# Patient Record
Sex: FEMALE | Race: AMERICAN INDIAN OR ALASKA NATIVE | NOT HISPANIC OR LATINO | Employment: UNEMPLOYED
[De-identification: names, ages, dates, MRNs, and addresses within clinical notes are randomized per-mention and may not be internally consistent; named-entity substitution may affect disease eponyms.]

---

## 2017-06-24 ENCOUNTER — HEALTH MAINTENANCE LETTER (OUTPATIENT)
Age: 22
End: 2017-06-24

## 2019-10-02 ENCOUNTER — HEALTH MAINTENANCE LETTER (OUTPATIENT)
Age: 24
End: 2019-10-02

## 2021-01-15 ENCOUNTER — HEALTH MAINTENANCE LETTER (OUTPATIENT)
Age: 26
End: 2021-01-15

## 2021-09-05 ENCOUNTER — HEALTH MAINTENANCE LETTER (OUTPATIENT)
Age: 26
End: 2021-09-05

## 2022-02-19 ENCOUNTER — HEALTH MAINTENANCE LETTER (OUTPATIENT)
Age: 27
End: 2022-02-19

## 2022-10-22 ENCOUNTER — HEALTH MAINTENANCE LETTER (OUTPATIENT)
Age: 27
End: 2022-10-22

## 2023-04-01 ENCOUNTER — HEALTH MAINTENANCE LETTER (OUTPATIENT)
Age: 28
End: 2023-04-01

## 2023-06-14 ENCOUNTER — HOSPITAL ENCOUNTER (EMERGENCY)
Facility: CLINIC | Age: 28
Discharge: HOME | End: 2023-06-15
Attending: EMERGENCY MEDICINE | Admitting: EMERGENCY MEDICINE
Payer: COMMERCIAL

## 2023-06-14 VITALS
OXYGEN SATURATION: 97 % | SYSTOLIC BLOOD PRESSURE: 116 MMHG | DIASTOLIC BLOOD PRESSURE: 77 MMHG | HEIGHT: 62 IN | BODY MASS INDEX: 41.59 KG/M2 | RESPIRATION RATE: 18 BRPM | HEART RATE: 68 BPM | WEIGHT: 226 LBS | TEMPERATURE: 98 F

## 2023-06-14 DIAGNOSIS — R03.0 ELEVATED BLOOD PRESSURE READING WITHOUT DIAGNOSIS OF HYPERTENSION: ICD-10-CM

## 2023-06-14 LAB
ALBUMIN SERPL BCG-MCNC: 3.9 G/DL (ref 3.5–5.2)
ALBUMIN UR-MCNC: NEGATIVE MG/DL
ALP SERPL-CCNC: 75 U/L (ref 35–104)
ALT SERPL W P-5'-P-CCNC: 22 U/L (ref 0–50)
ANION GAP SERPL CALCULATED.3IONS-SCNC: 12 MMOL/L (ref 7–15)
APPEARANCE UR: CLEAR
AST SERPL W P-5'-P-CCNC: 19 U/L (ref 0–45)
BACTERIA #/AREA URNS HPF: ABNORMAL /HPF
BASOPHILS # BLD AUTO: 0.1 10E3/UL (ref 0–0.2)
BASOPHILS NFR BLD AUTO: 1 %
BILIRUB SERPL-MCNC: 0.3 MG/DL
BILIRUB UR QL STRIP: NEGATIVE
BUN SERPL-MCNC: 15.2 MG/DL (ref 6–20)
CALCIUM SERPL-MCNC: 9.3 MG/DL (ref 8.6–10)
CHLORIDE SERPL-SCNC: 104 MMOL/L (ref 98–107)
COLOR UR AUTO: ABNORMAL
CREAT SERPL-MCNC: 0.8 MG/DL (ref 0.51–0.95)
DEPRECATED HCO3 PLAS-SCNC: 23 MMOL/L (ref 22–29)
EOSINOPHIL # BLD AUTO: 0.5 10E3/UL (ref 0–0.7)
EOSINOPHIL NFR BLD AUTO: 5 %
ERYTHROCYTE [DISTWIDTH] IN BLOOD BY AUTOMATED COUNT: 13.5 % (ref 10–15)
GFR SERPL CREATININE-BSD FRML MDRD: >90 ML/MIN/1.73M2
GLUCOSE SERPL-MCNC: 94 MG/DL (ref 70–99)
GLUCOSE UR STRIP-MCNC: NEGATIVE MG/DL
HCT VFR BLD AUTO: 40.1 % (ref 35–47)
HGB BLD-MCNC: 12.6 G/DL (ref 11.7–15.7)
HGB UR QL STRIP: ABNORMAL
IMM GRANULOCYTES # BLD: 0 10E3/UL
IMM GRANULOCYTES NFR BLD: 0 %
KETONES UR STRIP-MCNC: NEGATIVE MG/DL
LEUKOCYTE ESTERASE UR QL STRIP: ABNORMAL
LIPASE SERPL-CCNC: 31 U/L (ref 13–60)
LYMPHOCYTES # BLD AUTO: 3.2 10E3/UL (ref 0.8–5.3)
LYMPHOCYTES NFR BLD AUTO: 31 %
MCH RBC QN AUTO: 28.1 PG (ref 26.5–33)
MCHC RBC AUTO-ENTMCNC: 31.4 G/DL (ref 31.5–36.5)
MCV RBC AUTO: 90 FL (ref 78–100)
MONOCYTES # BLD AUTO: 0.9 10E3/UL (ref 0–1.3)
MONOCYTES NFR BLD AUTO: 8 %
MUCOUS THREADS #/AREA URNS LPF: PRESENT /LPF
NEUTROPHILS # BLD AUTO: 5.7 10E3/UL (ref 1.6–8.3)
NEUTROPHILS NFR BLD AUTO: 55 %
NITRATE UR QL: NEGATIVE
NRBC # BLD AUTO: 0 10E3/UL
NRBC BLD AUTO-RTO: 0 /100
PH UR STRIP: 6 [PH] (ref 5–7)
PLATELET # BLD AUTO: 390 10E3/UL (ref 150–450)
POTASSIUM SERPL-SCNC: 4.4 MMOL/L (ref 3.4–5.3)
PROT SERPL-MCNC: 6.8 G/DL (ref 6.4–8.3)
RBC # BLD AUTO: 4.48 10E6/UL (ref 3.8–5.2)
RBC URINE: 1 /HPF
SODIUM SERPL-SCNC: 139 MMOL/L (ref 136–145)
SP GR UR STRIP: 1.01 (ref 1–1.03)
SQUAMOUS EPITHELIAL: 1 /HPF
UROBILINOGEN UR STRIP-MCNC: NORMAL MG/DL
WBC # BLD AUTO: 10.4 10E3/UL (ref 4–11)
WBC URINE: 4 /HPF

## 2023-06-14 PROCEDURE — 99283 EMERGENCY DEPT VISIT LOW MDM: CPT | Performed by: EMERGENCY MEDICINE

## 2023-06-14 PROCEDURE — 83690 ASSAY OF LIPASE: CPT | Performed by: EMERGENCY MEDICINE

## 2023-06-14 PROCEDURE — 85025 COMPLETE CBC W/AUTO DIFF WBC: CPT | Performed by: EMERGENCY MEDICINE

## 2023-06-14 PROCEDURE — 36415 COLL VENOUS BLD VENIPUNCTURE: CPT | Performed by: EMERGENCY MEDICINE

## 2023-06-14 PROCEDURE — 81003 URINALYSIS AUTO W/O SCOPE: CPT | Performed by: EMERGENCY MEDICINE

## 2023-06-14 PROCEDURE — 80053 COMPREHEN METABOLIC PANEL: CPT | Performed by: EMERGENCY MEDICINE

## 2023-06-14 ASSESSMENT — ACTIVITIES OF DAILY LIVING (ADL): ADLS_ACUITY_SCORE: 35

## 2023-06-15 NOTE — ED PROVIDER NOTES
"    History     chief complaint  HPI  Patient is a 28-year-old G1, P1 female who is postpartum from vaginal delivery roughly 3-1/2 weeks with a history of prehypertension, elevated BMI, and gestational diabetes who is presenting with elevated blood pressures.  She had a blood pressure elevated in the 150s today.  Pregnancy was not complicated by preeclampsia.  She was not on blood pressure meds prior to the pregnancy but was told that she had prehypertension and was on a baby aspirin throughout her pregnancy.  Through the last week she has had intermittent episodes where she feels \"fuzzy\".  No vertigo symptoms.  Slightly lightheaded feeling.  Associated with palpitations.  No dyspnea or chest pain.  No leg swelling.  No persistent abdominal pain.  No persistent headaches.  No vision changes.  She was told to come in by the nurse line for her hypertension.    Review of Systems:  All organ systems below were reviewed and are negative unless indicated in the HPI.    Constitutional  Eyes  ENT  Respiratory  Cardiovascular  Gastrointestinal  Genitourinary  Musculoskeletal  Skin  Neuro    Allergies:  No Known Allergies    Problem List:    Patient Active Problem List    Diagnosis Date Noted     Obesity, morbid (more than 100 lbs over ideal weight or BMI > 40) (H) 06/26/2013     Priority: Medium     Tobacco abuse 06/26/2013     Priority: Medium     Acne 03/13/2013     Priority: Medium        Past Medical History:    Past Medical History:   Diagnosis Date     Acne      Allergic rhinitis      Obesity        Past Surgical History:    Past Surgical History:   Procedure Laterality Date     NO HISTORY OF SURGERY         Family History:    Family History   Problem Relation Age of Onset     Diabetes Paternal Grandmother      Hypertension Maternal Grandmother        Medications:    cyclobenzaprine (FLEXERIL) 10 MG tablet  levonorgestrel-ethinyl estradiol (AVIANE,ALESSE,LESSINA) 0.1-20 MG-MCG per tablet  naproxen (NAPROSYN) 500 MG " "tablet          Physical Exam   BP: (!) 149/107  Pulse: 80  Temp: 98  F (36.7  C)  Resp: 18  Height: 157.5 cm (5' 2\")  Weight: 102.5 kg (226 lb)  SpO2: 99 %    Gen: Vital signs reviewed  Eyes: Sclera white, pupils round  ENT: External ears and nares normal  Card: Regular rate and rhythm  Resp: No respiratory distress. Lungs clear to auscultation bilaterally  Abd: Soft, non-distended, non-tender  Extremities: Symmetric distal pulses.  Skin: No rashes  Neuro: Alert, speech normal. Face is symmetric.  Strength and sensation intact throughout.  No dysmetria.  Visual fields grossly intact.  Hearing grossly intact.       ED Course        Procedures                Results for orders placed or performed during the hospital encounter of 06/14/23 (from the past 24 hour(s))   CBC with platelets differential    Narrative    The following orders were created for panel order CBC with platelets differential.  Procedure                               Abnormality         Status                     ---------                               -----------         ------                     CBC with platelets and d...[[id number]]  Abnormal            Final result                 Please view results for these tests on the individual orders.   Comprehensive metabolic panel   Result Value Ref Range    Sodium 139 136 - 145 mmol/L    Potassium 4.4 3.4 - 5.3 mmol/L    Chloride 104 98 - 107 mmol/L    Carbon Dioxide (CO2) 23 22 - 29 mmol/L    Anion Gap 12 7 - 15 mmol/L    Urea Nitrogen 15.2 6.0 - 20.0 mg/dL    Creatinine 0.80 0.51 - 0.95 mg/dL    Calcium 9.3 8.6 - 10.0 mg/dL    Glucose 94 70 - 99 mg/dL    Alkaline Phosphatase 75 35 - 104 U/L    AST 19 0 - 45 U/L    ALT 22 0 - 50 U/L    Protein Total 6.8 6.4 - 8.3 g/dL    Albumin 3.9 3.5 - 5.2 g/dL    Bilirubin Total 0.3 <=1.2 mg/dL    GFR Estimate >90 >60 mL/min/1.73m2   Lipase   Result Value Ref Range    Lipase 31 13 - 60 U/L   CBC with platelets and differential   Result Value Ref Range    WBC " Count 10.4 4.0 - 11.0 10e3/uL    RBC Count 4.48 3.80 - 5.20 10e6/uL    Hemoglobin 12.6 11.7 - 15.7 g/dL    Hematocrit 40.1 35.0 - 47.0 %    MCV 90 78 - 100 fL    MCH 28.1 26.5 - 33.0 pg    MCHC 31.4 (L) 31.5 - 36.5 g/dL    RDW 13.5 10.0 - 15.0 %    Platelet Count 390 150 - 450 10e3/uL    % Neutrophils 55 %    % Lymphocytes 31 %    % Monocytes 8 %    % Eosinophils 5 %    % Basophils 1 %    % Immature Granulocytes 0 %    NRBCs per 100 WBC 0 <1 /100    Absolute Neutrophils 5.7 1.6 - 8.3 10e3/uL    Absolute Lymphocytes 3.2 0.8 - 5.3 10e3/uL    Absolute Monocytes 0.9 0.0 - 1.3 10e3/uL    Absolute Eosinophils 0.5 0.0 - 0.7 10e3/uL    Absolute Basophils 0.1 0.0 - 0.2 10e3/uL    Absolute Immature Granulocytes 0.0 <=0.4 10e3/uL    Absolute NRBCs 0.0 10e3/uL   UA with Microscopic reflex to Culture    Specimen: Urine, Clean Catch   Result Value Ref Range    Color Urine Straw Colorless, Straw, Light Yellow, Yellow    Appearance Urine Clear Clear    Glucose Urine Negative Negative mg/dL    Bilirubin Urine Negative Negative    Ketones Urine Negative Negative mg/dL    Specific Gravity Urine 1.009 1.003 - 1.035    Blood Urine Moderate (A) Negative    pH Urine 6.0 5.0 - 7.0    Protein Albumin Urine Negative Negative mg/dL    Urobilinogen Urine Normal Normal, 2.0 mg/dL    Nitrite Urine Negative Negative    Leukocyte Esterase Urine Trace (A) Negative    Bacteria Urine Few (A) None Seen /HPF    Mucus Urine Present (A) None Seen /LPF    RBC Urine 1 <=2 /HPF    WBC Urine 4 <=5 /HPF    Squamous Epithelials Urine 1 <=1 /HPF    Narrative    Urine Culture not indicated       Medications - No data to display      Consultations:  None    Social Determinants of Health:  Presents with baby and her significant other    Assessments & Plan (with Medical Decision Making)       I have reviewed the nursing notes.    I have reviewed the findings, diagnosis, plan and need for follow up with the patient.      Medical Decision Making  On arrival, patient  "is well-appearing with a normal neurologic exam.  She is hypertensive here and initial blood pressure reading but after sitting in her bed, this resolved without intervention and stays low.  She has no elevated liver enzymes, proteinuria, visual symptoms, persistent headaches.  Kidney function is reassuring.  At this point I do not feel that patient has preeclampsia.  I do not feel that emergent OB consult is indicated.  She may be having higher blood pressures at baseline as she did have a history of \"prehypertension\" prior to the baby.  I discussed strict return precautions with patient as well as outpatient follow-up and patient was discharged home in stable condition.    Final diagnoses:   Elevated blood pressure reading without diagnosis of hypertension         Ha Zacarias M.D.   [facility] Emergency Department     Ha Zacarias MD  06/15/23 0129    "

## 2023-06-15 NOTE — DISCHARGE INSTRUCTIONS
Return here if your symptoms worsen or you develop worsening headaches, vision changes, abdominal pain.  Your laboratory work-up looks good today.

## 2023-06-15 NOTE — ED TRIAGE NOTES
HTN - ongoing episodes with dizziness that has been intermittent since wed - seen in UC. Pt does not take BP meds.      Triage Assessment     Row Name 06/14/23 4098       Triage Assessment (Adult)    Airway WDL WDL       Respiratory WDL    Respiratory WDL WDL       Cardiac WDL    Cardiac WDL WDL

## 2023-07-31 ENCOUNTER — APPOINTMENT (OUTPATIENT)
Dept: ULTRASOUND IMAGING | Facility: CLINIC | Age: 28
End: 2023-07-31
Attending: FAMILY MEDICINE
Payer: COMMERCIAL

## 2023-07-31 ENCOUNTER — HOSPITAL ENCOUNTER (EMERGENCY)
Facility: CLINIC | Age: 28
Discharge: HOME | End: 2023-07-31
Attending: FAMILY MEDICINE | Admitting: FAMILY MEDICINE
Payer: COMMERCIAL

## 2023-07-31 VITALS
SYSTOLIC BLOOD PRESSURE: 109 MMHG | DIASTOLIC BLOOD PRESSURE: 63 MMHG | HEIGHT: 63 IN | TEMPERATURE: 98 F | RESPIRATION RATE: 16 BRPM | BODY MASS INDEX: 40.75 KG/M2 | HEART RATE: 100 BPM | WEIGHT: 230 LBS | OXYGEN SATURATION: 97 %

## 2023-07-31 DIAGNOSIS — R07.9 CHEST PAIN, UNSPECIFIED TYPE: ICD-10-CM

## 2023-07-31 DIAGNOSIS — M54.2 NECK PAIN ON RIGHT SIDE: ICD-10-CM

## 2023-07-31 LAB
ALBUMIN SERPL BCG-MCNC: 4.9 G/DL (ref 3.5–5.2)
ALBUMIN UR-MCNC: NEGATIVE MG/DL
ALP SERPL-CCNC: 70 U/L (ref 35–104)
ALT SERPL W P-5'-P-CCNC: 32 U/L (ref 0–50)
ANION GAP SERPL CALCULATED.3IONS-SCNC: 13 MMOL/L (ref 7–15)
APPEARANCE UR: CLEAR
AST SERPL W P-5'-P-CCNC: 22 U/L (ref 0–45)
BASOPHILS # BLD AUTO: 0 10E3/UL (ref 0–0.2)
BASOPHILS NFR BLD AUTO: 0 %
BILIRUB SERPL-MCNC: 0.3 MG/DL
BILIRUB UR QL STRIP: NEGATIVE
BUN SERPL-MCNC: 9.6 MG/DL (ref 6–20)
CALCIUM SERPL-MCNC: 10 MG/DL (ref 8.6–10)
CHLORIDE SERPL-SCNC: 101 MMOL/L (ref 98–107)
COLOR UR AUTO: NORMAL
CREAT SERPL-MCNC: 0.66 MG/DL (ref 0.51–0.95)
D DIMER PPP FEU-MCNC: 0.32 UG/ML FEU (ref 0–0.5)
DEPRECATED HCO3 PLAS-SCNC: 24 MMOL/L (ref 22–29)
EOSINOPHIL # BLD AUTO: 0 10E3/UL (ref 0–0.7)
EOSINOPHIL NFR BLD AUTO: 0 %
ERYTHROCYTE [DISTWIDTH] IN BLOOD BY AUTOMATED COUNT: 13.2 % (ref 10–15)
GFR SERPL CREATININE-BSD FRML MDRD: >90 ML/MIN/1.73M2
GLUCOSE SERPL-MCNC: 120 MG/DL (ref 70–99)
GLUCOSE UR STRIP-MCNC: NEGATIVE MG/DL
HCT VFR BLD AUTO: 44.2 % (ref 35–47)
HGB BLD-MCNC: 14.4 G/DL (ref 11.7–15.7)
HGB UR QL STRIP: NEGATIVE
IMM GRANULOCYTES # BLD: 0.1 10E3/UL
IMM GRANULOCYTES NFR BLD: 1 %
KETONES UR STRIP-MCNC: NEGATIVE MG/DL
LEUKOCYTE ESTERASE UR QL STRIP: NEGATIVE
LYMPHOCYTES # BLD AUTO: 1.9 10E3/UL (ref 0.8–5.3)
LYMPHOCYTES NFR BLD AUTO: 14 %
MCH RBC QN AUTO: 28.3 PG (ref 26.5–33)
MCHC RBC AUTO-ENTMCNC: 32.6 G/DL (ref 31.5–36.5)
MCV RBC AUTO: 87 FL (ref 78–100)
MONOCYTES # BLD AUTO: 0.5 10E3/UL (ref 0–1.3)
MONOCYTES NFR BLD AUTO: 4 %
NEUTROPHILS # BLD AUTO: 10.7 10E3/UL (ref 1.6–8.3)
NEUTROPHILS NFR BLD AUTO: 81 %
NITRATE UR QL: NEGATIVE
NRBC # BLD AUTO: 0 10E3/UL
NRBC BLD AUTO-RTO: 0 /100
NT-PROBNP SERPL-MCNC: 40 PG/ML (ref 0–450)
PH UR STRIP: 5 [PH] (ref 5–7)
PLATELET # BLD AUTO: 469 10E3/UL (ref 150–450)
POTASSIUM SERPL-SCNC: 4.2 MMOL/L (ref 3.4–5.3)
PROT SERPL-MCNC: 8.1 G/DL (ref 6.4–8.3)
RBC # BLD AUTO: 5.08 10E6/UL (ref 3.8–5.2)
RBC URINE: 0 /HPF
SODIUM SERPL-SCNC: 138 MMOL/L (ref 136–145)
SP GR UR STRIP: 1.01 (ref 1–1.03)
SQUAMOUS EPITHELIAL: 1 /HPF
TROPONIN T SERPL HS-MCNC: <6 NG/L
UROBILINOGEN UR STRIP-MCNC: NORMAL MG/DL
WBC # BLD AUTO: 13.1 10E3/UL (ref 4–11)
WBC URINE: 1 /HPF

## 2023-07-31 PROCEDURE — 80053 COMPREHEN METABOLIC PANEL: CPT | Performed by: FAMILY MEDICINE

## 2023-07-31 PROCEDURE — 36415 COLL VENOUS BLD VENIPUNCTURE: CPT | Performed by: FAMILY MEDICINE

## 2023-07-31 PROCEDURE — 85025 COMPLETE CBC W/AUTO DIFF WBC: CPT | Performed by: FAMILY MEDICINE

## 2023-07-31 PROCEDURE — 81001 URINALYSIS AUTO W/SCOPE: CPT | Performed by: FAMILY MEDICINE

## 2023-07-31 PROCEDURE — 84484 ASSAY OF TROPONIN QUANT: CPT | Performed by: FAMILY MEDICINE

## 2023-07-31 PROCEDURE — 83880 ASSAY OF NATRIURETIC PEPTIDE: CPT | Performed by: FAMILY MEDICINE

## 2023-07-31 PROCEDURE — 93005 ELECTROCARDIOGRAM TRACING: CPT

## 2023-07-31 PROCEDURE — 76536 US EXAM OF HEAD AND NECK: CPT

## 2023-07-31 PROCEDURE — 85379 FIBRIN DEGRADATION QUANT: CPT | Performed by: FAMILY MEDICINE

## 2023-07-31 PROCEDURE — 99285 EMERGENCY DEPT VISIT HI MDM: CPT | Mod: 25

## 2023-07-31 PROCEDURE — 99284 EMERGENCY DEPT VISIT MOD MDM: CPT | Mod: 25 | Performed by: FAMILY MEDICINE

## 2023-07-31 PROCEDURE — 93010 ELECTROCARDIOGRAM REPORT: CPT | Performed by: FAMILY MEDICINE

## 2023-07-31 ASSESSMENT — ACTIVITIES OF DAILY LIVING (ADL): ADLS_ACUITY_SCORE: 35

## 2023-08-01 NOTE — DISCHARGE INSTRUCTIONS
We did not find a worrisome cause for your chest pain and palpitations.  Your blood work and EKG were reassuring.  I suspect that your symptoms were related to side effects of the prednisone.  Please discontinue the prednisone for now.  Follow-up with your primary care provider in the next day as planned to review the results of your event monitor.  Discuss whether you should have any additional testing for your neck and throat pain.  Return to the emergency department at any time if you develop new or worsening symptoms.

## 2023-08-01 NOTE — ED TRIAGE NOTES
Started on prednisone today and about 2 hrs ago started feeling pain to the left side of her chest and bilateral arm numbness.  Recently delivered child 2 months ago and being worked up for palpitations as she wore a holter monitor and just turned that in, does not have results. Mentions on dirve in she had some stabbing pain in right side. Denies nausea or vomiting
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
## 2023-08-04 ENCOUNTER — APPOINTMENT (OUTPATIENT)
Dept: GENERAL RADIOLOGY | Facility: CLINIC | Age: 28
End: 2023-08-04
Attending: EMERGENCY MEDICINE
Payer: COMMERCIAL

## 2023-08-04 ENCOUNTER — HOSPITAL ENCOUNTER (EMERGENCY)
Facility: CLINIC | Age: 28
Discharge: HOME | End: 2023-08-04
Attending: EMERGENCY MEDICINE | Admitting: EMERGENCY MEDICINE
Payer: COMMERCIAL

## 2023-08-04 VITALS
DIASTOLIC BLOOD PRESSURE: 90 MMHG | RESPIRATION RATE: 18 BRPM | BODY MASS INDEX: 39.34 KG/M2 | HEIGHT: 63 IN | HEART RATE: 94 BPM | SYSTOLIC BLOOD PRESSURE: 140 MMHG | TEMPERATURE: 98.2 F | OXYGEN SATURATION: 98 % | WEIGHT: 222 LBS

## 2023-08-04 DIAGNOSIS — R00.2 PALPITATIONS: ICD-10-CM

## 2023-08-04 LAB
ALBUMIN SERPL BCG-MCNC: 4.5 G/DL (ref 3.5–5.2)
ALP SERPL-CCNC: 62 U/L (ref 35–104)
ALT SERPL W P-5'-P-CCNC: 63 U/L (ref 0–50)
ANION GAP SERPL CALCULATED.3IONS-SCNC: 12 MMOL/L (ref 7–15)
AST SERPL W P-5'-P-CCNC: 35 U/L (ref 0–45)
BASOPHILS # BLD AUTO: 0.1 10E3/UL (ref 0–0.2)
BASOPHILS NFR BLD AUTO: 1 %
BILIRUB SERPL-MCNC: 0.8 MG/DL
BUN SERPL-MCNC: 9.2 MG/DL (ref 6–20)
CALCIUM SERPL-MCNC: 9.4 MG/DL (ref 8.6–10)
CHLORIDE SERPL-SCNC: 104 MMOL/L (ref 98–107)
CREAT SERPL-MCNC: 0.8 MG/DL (ref 0.51–0.95)
D DIMER PPP FEU-MCNC: <0.27 UG/ML FEU (ref 0–0.5)
DEPRECATED HCO3 PLAS-SCNC: 25 MMOL/L (ref 22–29)
EOSINOPHIL # BLD AUTO: 0.3 10E3/UL (ref 0–0.7)
EOSINOPHIL NFR BLD AUTO: 3 %
ERYTHROCYTE [DISTWIDTH] IN BLOOD BY AUTOMATED COUNT: 13.3 % (ref 10–15)
GFR SERPL CREATININE-BSD FRML MDRD: >90 ML/MIN/1.73M2
GLUCOSE SERPL-MCNC: 105 MG/DL (ref 70–99)
HCT VFR BLD AUTO: 44.9 % (ref 35–47)
HGB BLD-MCNC: 14.3 G/DL (ref 11.7–15.7)
IMM GRANULOCYTES # BLD: 0 10E3/UL
IMM GRANULOCYTES NFR BLD: 0 %
LYMPHOCYTES # BLD AUTO: 2.2 10E3/UL (ref 0.8–5.3)
LYMPHOCYTES NFR BLD AUTO: 21 %
MAGNESIUM SERPL-MCNC: 1.9 MG/DL (ref 1.7–2.3)
MCH RBC QN AUTO: 27.8 PG (ref 26.5–33)
MCHC RBC AUTO-ENTMCNC: 31.8 G/DL (ref 31.5–36.5)
MCV RBC AUTO: 87 FL (ref 78–100)
MONOCYTES # BLD AUTO: 0.7 10E3/UL (ref 0–1.3)
MONOCYTES NFR BLD AUTO: 7 %
NEUTROPHILS # BLD AUTO: 6.8 10E3/UL (ref 1.6–8.3)
NEUTROPHILS NFR BLD AUTO: 68 %
NRBC # BLD AUTO: 0 10E3/UL
NRBC BLD AUTO-RTO: 0 /100
PLATELET # BLD AUTO: 394 10E3/UL (ref 150–450)
POTASSIUM SERPL-SCNC: 4.2 MMOL/L (ref 3.4–5.3)
PROT SERPL-MCNC: 7.3 G/DL (ref 6.4–8.3)
RBC # BLD AUTO: 5.15 10E6/UL (ref 3.8–5.2)
SODIUM SERPL-SCNC: 141 MMOL/L (ref 136–145)
TROPONIN T SERPL HS-MCNC: <6 NG/L
TSH SERPL DL<=0.005 MIU/L-ACNC: 0.92 UIU/ML (ref 0.3–4.2)
WBC # BLD AUTO: 10.1 10E3/UL (ref 4–11)

## 2023-08-04 PROCEDURE — 84443 ASSAY THYROID STIM HORMONE: CPT | Performed by: EMERGENCY MEDICINE

## 2023-08-04 PROCEDURE — 85379 FIBRIN DEGRADATION QUANT: CPT | Performed by: EMERGENCY MEDICINE

## 2023-08-04 PROCEDURE — 93010 ELECTROCARDIOGRAM REPORT: CPT | Performed by: EMERGENCY MEDICINE

## 2023-08-04 PROCEDURE — 99284 EMERGENCY DEPT VISIT MOD MDM: CPT | Mod: 25 | Performed by: EMERGENCY MEDICINE

## 2023-08-04 PROCEDURE — 82435 ASSAY OF BLOOD CHLORIDE: CPT | Performed by: EMERGENCY MEDICINE

## 2023-08-04 PROCEDURE — 258N000003 HC RX IP 258 OP 636: Performed by: EMERGENCY MEDICINE

## 2023-08-04 PROCEDURE — 99285 EMERGENCY DEPT VISIT HI MDM: CPT | Mod: 25

## 2023-08-04 PROCEDURE — 85025 COMPLETE CBC W/AUTO DIFF WBC: CPT | Performed by: EMERGENCY MEDICINE

## 2023-08-04 PROCEDURE — 71046 X-RAY EXAM CHEST 2 VIEWS: CPT

## 2023-08-04 PROCEDURE — 84484 ASSAY OF TROPONIN QUANT: CPT | Performed by: EMERGENCY MEDICINE

## 2023-08-04 PROCEDURE — 83735 ASSAY OF MAGNESIUM: CPT | Performed by: EMERGENCY MEDICINE

## 2023-08-04 PROCEDURE — 93005 ELECTROCARDIOGRAM TRACING: CPT

## 2023-08-04 PROCEDURE — 96360 HYDRATION IV INFUSION INIT: CPT

## 2023-08-04 PROCEDURE — 82374 ASSAY BLOOD CARBON DIOXIDE: CPT | Performed by: EMERGENCY MEDICINE

## 2023-08-04 PROCEDURE — 36415 COLL VENOUS BLD VENIPUNCTURE: CPT | Performed by: EMERGENCY MEDICINE

## 2023-08-04 RX ADMIN — SODIUM CHLORIDE 1000 ML: 9 INJECTION, SOLUTION INTRAVENOUS at 10:54

## 2023-08-04 ASSESSMENT — ACTIVITIES OF DAILY LIVING (ADL): ADLS_ACUITY_SCORE: 35

## 2023-08-04 NOTE — ED TRIAGE NOTES
Pt presents with concerns of chest pain and pain in the left arm.  Pt states that it started a half hour ago.  Pt states that she feels like her heart is racing.  Pt states that she was seen Monday for similar concerns.      Triage Assessment       Row Name 08/04/23 0900       Triage Assessment (Adult)    Airway WDL WDL       Respiratory WDL    Respiratory WDL WDL       Skin Circulation/Temperature WDL    Skin Circulation/Temperature WDL WDL       Cardiac WDL    Cardiac WDL X       Peripheral/Neurovascular WDL    Peripheral Neurovascular WDL WDL       Cognitive/Neuro/Behavioral WDL    Cognitive/Neuro/Behavioral WDL WDL

## 2023-08-04 NOTE — DISCHARGE INSTRUCTIONS
Your blood work, EKG, and chest x-ray did not show any acute worrisome findings.  Specifically, a D-dimer was negative, which rules out a blood clot.  Your troponin was also normal, and did not see any sign of acute strain or stress on your heart.  Your thyroid hormone was checked and was normal.  Your liver tests also appeared normal, did not see acute worrisome finding to suggest an acute gallbladder problem.      Should follow closely with your primary doctor regarding your recent Holter monitor to determine if you have an intermittent abnormal heart rhythm that is causing the palpitations.  You should also follow-up with your primary doctor regarding your concerns about your gallbladder, as they may need to do additional testing, such as a HIDA scan, for evaluation    If you have any new or worrisome symptoms, do not hesitate to return to the emergency room for evaluation

## 2023-08-04 NOTE — ED PROVIDER NOTES
"  History     Chief Complaint   Patient presents with    Chest Pain     HPI  Rosario Gibson is a 28 year old female who returns to the emergency room for chest pain.  Symptoms started half an hour ago at rest.  Had palpitations, pressure in her chest, and felt tingling down her left arm.  Symptoms have improved upon arrival to the ED.  She has had similar symptoms before, and was seen at the beginning of the week for the same.  Was told that it may be due to prednisone that she was started on for throat swelling.  Since work-up did not reveal underlying etiology to explain her globus sensation and throat pain, she was advised to stop the prednisone.  She has not been taking it.  Followed up with her primary doctor and was started on famotidine twice daily as they suspect her symptoms are due to acid reflux.  Additionally, she is concerned about possible gallbladder issues, as \"all the women in my family had gallbladder issues after giving birth\".  Has occasionally had a pain in her right upper quadrant, but does not currently.  Has not had any nausea or vomiting.  No association with meals.    Allergies:  No Known Allergies    Problem List:    Patient Active Problem List    Diagnosis Date Noted    Obesity, morbid (more than 100 lbs over ideal weight or BMI > 40) (H) 06/26/2013     Priority: Medium    Tobacco abuse 06/26/2013     Priority: Medium    Acne 03/13/2013     Priority: Medium        Past Medical History:    Past Medical History:   Diagnosis Date    Acne     Allergic rhinitis     Obesity        Past Surgical History:    Past Surgical History:   Procedure Laterality Date    NO HISTORY OF SURGERY         Family History:    Family History   Problem Relation Age of Onset    Diabetes Paternal Grandmother     Hypertension Maternal Grandmother        Social History:  Marital Status:  Single [1]  Social History     Tobacco Use    Smoking status: Some Days     Packs/day: 0.30     Types: Cigarettes    " "Smokeless tobacco: Never   Substance Use Topics    Alcohol use: No     Alcohol/week: 0.0 standard drinks of alcohol    Drug use: No        Medications:    cyclobenzaprine (FLEXERIL) 10 MG tablet  levonorgestrel-ethinyl estradiol (AVIANE,ALESSE,LESSINA) 0.1-20 MG-MCG per tablet  naproxen (NAPROSYN) 500 MG tablet          Review of Systems   All other systems reviewed and are negative.      Physical Exam   BP: (!) 143/107  Pulse: 94  Temp: 98.2  F (36.8  C)  Resp: 20  Height: 160 cm (5' 3\")  Weight: 100.7 kg (222 lb)  SpO2: 99 %      Physical Exam  Vitals and nursing note reviewed.   Constitutional:       General: She is not in acute distress.     Appearance: Normal appearance. She is obese. She is not diaphoretic.   HENT:      Head: Atraumatic.      Mouth/Throat:      Mouth: Mucous membranes are moist.   Eyes:      General: No scleral icterus.     Conjunctiva/sclera: Conjunctivae normal.   Cardiovascular:      Rate and Rhythm: Normal rate and regular rhythm.      Heart sounds: Normal heart sounds.   Pulmonary:      Effort: Pulmonary effort is normal. No respiratory distress.      Breath sounds: Normal breath sounds.   Abdominal:      General: Abdomen is flat.      Tenderness: There is no abdominal tenderness. There is no guarding.   Musculoskeletal:      Cervical back: Neck supple.   Skin:     General: Skin is warm.      Findings: No rash.   Neurological:      General: No focal deficit present.      Mental Status: She is alert.   Psychiatric:         Mood and Affect: Mood normal.         Behavior: Behavior normal.         ED Course                 Procedures              EKG Interpretation:      Interpreted by Radha Kaufman DO  Time reviewed: 0930  Symptoms at time of EKG: Chest pain, palpitations  Rhythm: normal sinus   Rate: normal  Axis: normal  Ectopy: none  Conduction: normal  ST Segments/ T Waves: No ST-T wave changes  Q Waves: none  Comparison to prior: Unchanged from 7/31/23    Clinical Impression: " normal EKG      Critical Care time:  none               Results for orders placed or performed during the hospital encounter of 08/04/23 (from the past 24 hour(s))   CBC with platelets differential    Narrative    The following orders were created for panel order CBC with platelets differential.  Procedure                               Abnormality         Status                     ---------                               -----------         ------                     CBC with platelets and d...[[id number]]                      Final result                 Please view results for these tests on the individual orders.   D dimer quantitative   Result Value Ref Range    D-Dimer Quantitative <0.27 0.00 - 0.50 ug/mL FEU    Narrative    This D-dimer assay is intended for use in conjunction with a clinical pretest probability assessment model to exclude pulmonary embolism (PE) and deep venous thrombosis (DVT) in outpatients suspected of PE or DVT. The cut-off value is 0.50 ug/mL FEU.   Comprehensive metabolic panel   Result Value Ref Range    Sodium 141 136 - 145 mmol/L    Potassium 4.2 3.4 - 5.3 mmol/L    Chloride 104 98 - 107 mmol/L    Carbon Dioxide (CO2) 25 22 - 29 mmol/L    Anion Gap 12 7 - 15 mmol/L    Urea Nitrogen 9.2 6.0 - 20.0 mg/dL    Creatinine 0.80 0.51 - 0.95 mg/dL    Calcium 9.4 8.6 - 10.0 mg/dL    Glucose 105 (H) 70 - 99 mg/dL    Alkaline Phosphatase 62 35 - 104 U/L    AST 35 0 - 45 U/L    ALT 63 (H) 0 - 50 U/L    Protein Total 7.3 6.4 - 8.3 g/dL    Albumin 4.5 3.5 - 5.2 g/dL    Bilirubin Total 0.8 <=1.2 mg/dL    GFR Estimate >90 >60 mL/min/1.73m2   Troponin T, High Sensitivity   Result Value Ref Range    Troponin T, High Sensitivity <6 <=14 ng/L   Magnesium   Result Value Ref Range    Magnesium 1.9 1.7 - 2.3 mg/dL   TSH with free T4 reflex   Result Value Ref Range    TSH 0.92 0.30 - 4.20 uIU/mL   CBC with platelets and differential   Result Value Ref Range    WBC Count 10.1 4.0 - 11.0 10e3/uL    RBC Count  5.15 3.80 - 5.20 10e6/uL    Hemoglobin 14.3 11.7 - 15.7 g/dL    Hematocrit 44.9 35.0 - 47.0 %    MCV 87 78 - 100 fL    MCH 27.8 26.5 - 33.0 pg    MCHC 31.8 31.5 - 36.5 g/dL    RDW 13.3 10.0 - 15.0 %    Platelet Count 394 150 - 450 10e3/uL    % Neutrophils 68 %    % Lymphocytes 21 %    % Monocytes 7 %    % Eosinophils 3 %    % Basophils 1 %    % Immature Granulocytes 0 %    NRBCs per 100 WBC 0 <1 /100    Absolute Neutrophils 6.8 1.6 - 8.3 10e3/uL    Absolute Lymphocytes 2.2 0.8 - 5.3 10e3/uL    Absolute Monocytes 0.7 0.0 - 1.3 10e3/uL    Absolute Eosinophils 0.3 0.0 - 0.7 10e3/uL    Absolute Basophils 0.1 0.0 - 0.2 10e3/uL    Absolute Immature Granulocytes 0.0 <=0.4 10e3/uL    Absolute NRBCs 0.0 10e3/uL   XR Chest 2 Views    Narrative    XR CHEST 2 VIEWS   8/4/2023 10:06 AM     HISTORY: Palpitations, hypertension    COMPARISON: None.      Impression    IMPRESSION: No acute cardiopulmonary disease.    SARAH TOLENTINO MD         SYSTEM ID:  [id number]       Medications   0.9% sodium chloride BOLUS (0 mLs Intravenous Stopped 8/4/23 1135)       Assessments & Plan (with Medical Decision Making)  Rosario is a 28-year-old female returning to the emergency room with recurrence of chest pain and palpitations.  Was seen in the ER earlier this week for similar concern.  See history and focused physical exam as above  28-year-old obese female in no acute distress, is vitally stable and afebrile.  EKG was obtained on arrival shows normal sinus rhythm without any acute ST changes, ectopy, or arrhythmia.  Is unchanged from her previous EKG earlier this week.  No concerning focal findings on exam.  Will check blood work and chest x-ray.  She is agreeable to this plan  Labs and imaging as above.  TSH, troponin, D-dimer, magnesium, CBC, and CMP are all within normal limits.  No elevated LFTs or indication of acute biliary process.  Recommended patient follow-up closely with her primary doctor to determine if ultrasound versus HIDA scan  versus other work-up would be necessary related to her concerns of gallbladder disease.  Do not see an indication for emergent work-up currently in the ED since she is asymptomatic with normal lab findings.  Also, advised patient to follow-up with her primary doctor regarding the Holter monitor that she had just worn.  She says that the results had just come in on MyChart, and noted predominantly normal sinus rhythm with minimum heart rate of 48 bpm and maximum heart rate of 140s per minute, overall average rate of 80s.  She had occasional PACs and occasional PVCs that corresponded with symptoms.  Recommended she discuss possible treatment of PVCs with her primary doctor, as they could initiate beta-blocker or calcium channel blocker therapy if these episodes are frequent and bothersome, but based on the reports the patient read out loud today, I do not see an indication to treat.  Also do not see indication to treat based on her work-up here today.  Should she have any worsening symptoms she should come to the ER for evaluation, but otherwise may follow-up in clinic.  All questions were answered.  Discharged in no distress     I have reviewed the nursing notes.    I have reviewed the findings, diagnosis, plan and need for follow up with the patient.           Medical Decision Making  The patient's presentation was of moderate complexity (an undiagnosed new problem with uncertain diagnosis).    The patient's evaluation involved:  review of external note(s) from 1 sources (Clinic note from ED follow-up)  ordering and/or review of 3+ test(s) in this encounter (see separate area of note for details)    The patient's management necessitated only low risk treatment.        Discharge Medication List as of 8/4/2023 11:51 AM          Final diagnoses:   Palpitations       8/4/2023   [facility] EMERGENCY DEPT       Radha Kaufman,   08/04/23 3270

## 2023-10-04 ENCOUNTER — MEDICAL CORRESPONDENCE (OUTPATIENT)
Dept: HEALTH INFORMATION MANAGEMENT | Facility: CLINIC | Age: 28
End: 2023-10-04
Payer: COMMERCIAL

## 2023-11-27 ENCOUNTER — MEDICAL CORRESPONDENCE (OUTPATIENT)
Dept: HEALTH INFORMATION MANAGEMENT | Facility: CLINIC | Age: 28
End: 2023-11-27
Payer: COMMERCIAL

## 2024-01-10 ENCOUNTER — OFFICE VISIT (OUTPATIENT)
Dept: FAMILY MEDICINE | Facility: CLINIC | Age: 29
End: 2024-01-10
Payer: COMMERCIAL

## 2024-01-10 VITALS
SYSTOLIC BLOOD PRESSURE: 122 MMHG | HEIGHT: 65 IN | DIASTOLIC BLOOD PRESSURE: 80 MMHG | HEART RATE: 110 BPM | BODY MASS INDEX: 36.99 KG/M2 | RESPIRATION RATE: 20 BRPM | OXYGEN SATURATION: 98 % | WEIGHT: 222 LBS | TEMPERATURE: 97.1 F

## 2024-01-10 DIAGNOSIS — J30.2 SEASONAL ALLERGIC RHINITIS, UNSPECIFIED TRIGGER: ICD-10-CM

## 2024-01-10 DIAGNOSIS — Z30.09 COUNSELING FOR INITIATION OF BIRTH CONTROL METHOD: ICD-10-CM

## 2024-01-10 DIAGNOSIS — G89.29 CHRONIC LEFT SHOULDER PAIN: ICD-10-CM

## 2024-01-10 DIAGNOSIS — Z72.0 TOBACCO ABUSE: ICD-10-CM

## 2024-01-10 DIAGNOSIS — Z00.00 ROUTINE GENERAL MEDICAL EXAMINATION AT A HEALTH CARE FACILITY: Primary | ICD-10-CM

## 2024-01-10 DIAGNOSIS — Z76.89 ESTABLISHING CARE WITH NEW DOCTOR, ENCOUNTER FOR: ICD-10-CM

## 2024-01-10 DIAGNOSIS — M79.10 MUSCLE PAIN: ICD-10-CM

## 2024-01-10 DIAGNOSIS — M25.512 CHRONIC LEFT SHOULDER PAIN: ICD-10-CM

## 2024-01-10 DIAGNOSIS — E66.812 CLASS 2 OBESITY WITH BODY MASS INDEX (BMI) OF 36.0 TO 36.9 IN ADULT, UNSPECIFIED OBESITY TYPE, UNSPECIFIED WHETHER SERIOUS COMORBIDITY PRESENT: ICD-10-CM

## 2024-01-10 LAB — HCG UR QL: NEGATIVE

## 2024-01-10 PROCEDURE — 81025 URINE PREGNANCY TEST: CPT | Performed by: STUDENT IN AN ORGANIZED HEALTH CARE EDUCATION/TRAINING PROGRAM

## 2024-01-10 PROCEDURE — 99214 OFFICE O/P EST MOD 30 MIN: CPT | Mod: 25 | Performed by: STUDENT IN AN ORGANIZED HEALTH CARE EDUCATION/TRAINING PROGRAM

## 2024-01-10 PROCEDURE — 99385 PREV VISIT NEW AGE 18-39: CPT | Performed by: STUDENT IN AN ORGANIZED HEALTH CARE EDUCATION/TRAINING PROGRAM

## 2024-01-10 RX ORDER — ETONOGESTREL AND ETHINYL ESTRADIOL VAGINAL RING .015; .12 MG/D; MG/D
RING VAGINAL
Qty: 3 EACH | Refills: 0 | Status: SHIPPED | OUTPATIENT
Start: 2024-01-10 | End: 2024-03-22

## 2024-01-10 RX ORDER — FLUTICASONE PROPIONATE 50 MCG
1 SPRAY, SUSPENSION (ML) NASAL DAILY
Qty: 16 G | Refills: 1 | Status: SHIPPED | OUTPATIENT
Start: 2024-01-10 | End: 2024-04-07

## 2024-01-10 ASSESSMENT — ENCOUNTER SYMPTOMS
ABDOMINAL PAIN: 0
SORE THROAT: 0
CONSTIPATION: 0
MYALGIAS: 1
NAUSEA: 0
HEMATOCHEZIA: 0
FREQUENCY: 0
DIZZINESS: 1
WEAKNESS: 0
ARTHRALGIAS: 0
HEARTBURN: 1
PALPITATIONS: 0
PARESTHESIAS: 0
SHORTNESS OF BREATH: 0
FEVER: 0
CHILLS: 0
HEMATURIA: 0
BREAST MASS: 0
DIARRHEA: 0
HEADACHES: 1
COUGH: 0
EYE PAIN: 0
NERVOUS/ANXIOUS: 1
JOINT SWELLING: 0
DYSURIA: 0

## 2024-01-10 NOTE — PROGRESS NOTES
SUBJECTIVE:   Rosario is a 28 year old, presenting for the following:  Physical and Ear Fullness        1/10/2024    10:28 AM   Additional Questions   Roomed by Ashley CARRILLO       Healthy Habits:     Getting at least 3 servings of Calcium per day:  NO    Bi-annual eye exam:  Yes    Dental care twice a year:  Yes    Sleep apnea or symptoms of sleep apnea:  None    Diet:  Regular (no restrictions)    Frequency of exercise:  4-5 days/week    Duration of exercise:  15-30 minutes    Taking medications regularly:  Yes    Medication side effects:  Not applicable    Additional concerns today:  Yes  Birth control -     Urgent Care - fluid behind ears 2 weeks ago. No infection. Told had Eustachian tube dysfunction.    Breast tenderness. Technically is tenderness of chest wall superior to L breast. No noted lumps/bump, skin changes, breast/nipple issues. Does hold her son on this side exclusively as he does not like being held on the opposite side. No known trauma.    Bump on back of head. No know cause. About half an inch. Tender to touch.     Today's PHQ-2 Score:       1/10/2024    10:12 AM   PHQ-2 ( 1999 [employer])   Q1: Little interest or pleasure in doing things 0   Q2: Feeling down, depressed or hopeless 0   PHQ-2 Score 0   Q1: Little interest or pleasure in doing things Not at all   Q2: Feeling down, depressed or hopeless Not at all   PHQ-2 Score 0     Have you ever done Advance Care Planning? (For example, a Health Directive, POLST, or a discussion with a medical provider or your loved ones about your wishes): No, advance care planning information given to patient to review.  Patient declined advance care planning discussion at this time.    Social History     Tobacco Use    Smoking status: Some Days     Packs/day: .3     Types: Cigarettes    Smokeless tobacco: Never   Substance Use Topics    Alcohol use: No     Alcohol/week: 0.0 standard drinks of alcohol   2 packs a week.   Trying to cut back.  Does want to quit  eventually, but not there yet.           1/10/2024    10:12 AM   Alcohol Use   Prescreen: >3 drinks/day or >7 drinks/week? No     Reviewed orders with patient.  Reviewed health maintenance and updated orders accordingly - Yes  Labs reviewed in EPIC  BP Readings from Last 3 Encounters:   01/10/24 122/80   08/04/23 (!) 140/90   07/31/23 109/63    Wt Readings from Last 3 Encounters:   01/10/24 100.7 kg (222 lb)   08/04/23 100.7 kg (222 lb)   07/31/23 104.3 kg (230 lb)                  Patient Active Problem List   Diagnosis    Acne    Obesity, morbid (more than 100 lbs over ideal weight or BMI > 40) (H)    Tobacco abuse     Past Surgical History:   Procedure Laterality Date    NO HISTORY OF SURGERY         Social History     Tobacco Use    Smoking status: Some Days     Packs/day: .3     Types: Cigarettes    Smokeless tobacco: Never   Substance Use Topics    Alcohol use: No     Alcohol/week: 0.0 standard drinks of alcohol     Family History   Problem Relation Age of Onset    Diabetes Paternal Grandmother     Hypertension Maternal Grandmother          Current Outpatient Medications   Medication Sig Dispense Refill    cyclobenzaprine (FLEXERIL) 10 MG tablet Take 1 tablet (10 mg) by mouth nightly as needed for muscle spasms (Patient not taking: Reported on 1/10/2024) 14 tablet 1    levonorgestrel-ethinyl estradiol (AVIANE,ALESSE,LESSINA) 0.1-20 MG-MCG per tablet Take 1 tablet by mouth daily - DUE FOR PHYSICAL (Patient not taking: Reported on 1/10/2024) 28 tablet 0    naproxen (NAPROSYN) 500 MG tablet Take 1 tablet (500 mg) by mouth 2 times daily as needed for moderate pain (Patient not taking: Reported on 1/10/2024) 30 tablet 1     No Known Allergies  Recent Labs   Lab Test 08/04/23  1006 07/31/23  2116 06/14/23  2237   ALT 63* 32 22   CR 0.80 0.66 0.80   GFRESTIMATED >90 >90 >90   POTASSIUM 4.2 4.2 4.4   TSH 0.92  --   --     Alt elevation. Fell and hit back/shoulder blade. Had dizzy spells at that time. Had lump in  throat tested for thyroid which was fine.    Breast Cancer Screening:    FHS-7:       1/10/2024    10:13 AM   Breast CA Risk Assessment (FHS-7)   Did any of your first-degree relatives have breast or ovarian cancer? No   Did any of your relatives have bilateral breast cancer? No   Did any man in your family have breast cancer? No   Did any woman in your family have breast and ovarian cancer? Yes   Did any woman in your family have breast cancer before age 50 y? Unknown   Do you have 2 or more relatives with breast and/or ovarian cancer? No   Do you have 2 or more relatives with breast and/or bowel cancer? No     Patient under 40 years of age: Routine Mammogram Screening not recommended.     History of abnormal Pap smear: YES - updated in Problem List and Health Maintenance accordingly   Prior paps [facility] outside [facility].   Last was 2023 with normal cytology and +other strains high risk HPV.  Before that had Ascus + HPV+ high risk strains in in .  In  had Ascus and HR strains.  Colpo after first result was negative for NEWTON or malignancy.   Discussed and recommend at minimum repeat in July of this year to guide next steps.    Reviewed and updated as needed this visit by clinical staff   Tobacco  Allergies  Meds  Problems  Med Hx  Surg Hx  Fam Hx          Reviewed and updated as needed this visit by Provider   Tobacco  Allergies  Meds  Problems  Med Hx  Surg Hx  Fam Hx         Past Medical History:   Diagnosis Date    Acne     Allergic rhinitis     Obesity       Past Surgical History:   Procedure Laterality Date    NO HISTORY OF SURGERY       OB History    Para Term  AB Living   0 0 0 0 0 0   SAB IAB Ectopic Multiple Live Births   0 0 0 0 0       Review of Systems   Constitutional:  Negative for chills and fever.   HENT:  Positive for congestion and ear pain. Negative for hearing loss and sore throat.    Eyes:  Negative for pain and visual disturbance.  "  Respiratory:  Negative for cough and shortness of breath.    Cardiovascular:  Negative for chest pain, palpitations and peripheral edema.   Gastrointestinal:  Positive for heartburn. Negative for abdominal pain, constipation, diarrhea, hematochezia and nausea.   Breasts:  Positive for tenderness. Negative for breast mass and discharge.   Genitourinary:  Negative for dysuria, frequency, genital sores, hematuria, pelvic pain, urgency, vaginal bleeding and vaginal discharge.   Musculoskeletal:  Positive for myalgias. Negative for arthralgias and joint swelling.   Skin:  Negative for rash.   Neurological:  Positive for dizziness and headaches. Negative for weakness and paresthesias.   Psychiatric/Behavioral:  Negative for mood changes. The patient is nervous/anxious.        Dizziness and Headaches was back in July. Very random. Ibuprofen helps when has rare headache.     Prior history of anxiety and depression medications in past. Thinks it's worse and would like to return to evaluate mood for possible treatment.   OBJECTIVE:   /80   Pulse 110   Temp 97.1  F (36.2  C) (Temporal)   Resp 20   Ht 1.65 m (5' 4.96\")   Wt 100.7 kg (222 lb)   LMP 01/01/2024   SpO2 98%   BMI 36.99 kg/m    Physical Exam  GENERAL: healthy, alert and no distress  EYES: Eyes grossly normal to inspection, PERRL and conjunctivae and sclerae normal  HENT: ear canals normal, TM's normal though with note of fluid behind them, nose and mouth without ulcers or lesions, mild post nasal drainage noted in posterior oropharynx, oropharynx otherwise normal, normal tonsils.  NECK: no adenopathy, no asymmetry, masses, or scars and thyroid normal to palpation  RESP: lungs clear to auscultation - no rales, rhonchi or wheezes, normal rate and effort  BREAST: normal without masses, tenderness or nipple discharge and no palpable axillary masses or adenopathy  CV: regular rate and rhythm, no peripheral edema  ABDOMEN: soft, nontender, no " hepatosplenomegaly, no masses and bowel sounds normal  MS: no gross musculoskeletal defects noted, no edema  SKIN: no suspicious lesions or rashes  NEURO: Normal strength and tone, mentation intact and speech normal  PSYCH: mentation appears normal, affect normal/bright  LYMPH: no cervical, supraclavicular, or axillary adenopathy    Diagnostic Test Results:  Labs reviewed in Epic  Results for orders placed or performed in visit on 01/10/24 (from the past 24 hour(s))   HCG Qual, Urine ([id number])   Result Value Ref Range    hCG Urine Qualitative Negative Negative       ASSESSMENT/PLAN:   (Z00.00) Routine general medical examination at a health care facility  (primary encounter diagnosis)  (Z76.89) Establishing care with new doctor, encounter for  Comment:  Recommend annual wellness visits, screens, and immunizations as indicated.     (Z30.09) Counseling for initiation of birth control method  Comment: Discussed all birth control options with patient including risks and benefits of each.  Discussed the risks of estrogen based contraception including increased risk of blood clot, stroke, and high blood pressure.  UPT negtive. Patient chose method as noted. Rx sent. Will follow-up on satisfaction at future visit.  Plan: HCG Qual, Urine ([id number]), etonogestrel-ethinyl        estradiol (NUVARING) 0.12-0.015 MG/24HR vaginal        ring    (M79.10) Muscle pain  (M25.512,  G89.29) Chronic left shoulder pain  Comment: Pec muscle and L shoulder. Suspected MSK in origin related to holding of infant. No concerning findings/red flags on exam. Will trial topical diclofenac and exercises/stretching. Follow-up at next appt or PRN.  Plan: diclofenac (VOLTAREN) 1 % topical gel    (E66.9,  Z68.36) Class 2 obesity with body mass index (BMI) of 36.0 to 36.9 in adult, unspecified obesity type, unspecified whether serious comorbidity present  Comment: recommend healthy diet and exercise.    (J30.2) Seasonal allergic rhinitis, unspecified  "trigger  Comment: Exam findings of fluid behind TM's with no evidence of infection and PND suggestive. Trial of flonase.  Plan: fluticasone (FLONASE) 50 MCG/ACT nasal spray    (Z72.0) Tobacco abuse  Comment: trying to cut back. Aware of support options including counseling and medications. Will as for these if desires to pursue.     Patient has been advised of split billing requirements and indicates understanding: Yes      COUNSELING:  Reviewed preventive health counseling, as reflected in patient instructions       Regular exercise       Healthy diet/nutrition       Vision screening       Hearing screening       Immunizations  Declined: Covid-19 and Influenza due to Conscientious objector           Contraception       Family planning       Syphilis screening for high risk patients        HIV screeninx in teen years, 1x in adult years, and at intervals if high risk      BMI:   Estimated body mass index is 36.99 kg/m  as calculated from the following:    Height as of this encounter: 1.65 m (5' 4.96\").    Weight as of this encounter: 100.7 kg (222 lb).   Weight management plan: Discussed healthy diet and exercise guidelines      She reports that she has been smoking cigarettes. She has been smoking an average of 0.3 packs per day. She has never used smokeless tobacco.  Nicotine/Tobacco Cessation Plan:   Information offered: Patient not interested at this time          Liya Garcia DO  [facility]  "

## 2024-03-22 ENCOUNTER — OFFICE VISIT (OUTPATIENT)
Dept: FAMILY MEDICINE | Facility: CLINIC | Age: 29
End: 2024-03-22
Payer: COMMERCIAL

## 2024-03-22 ENCOUNTER — TELEPHONE (OUTPATIENT)
Dept: FAMILY MEDICINE | Facility: CLINIC | Age: 29
End: 2024-03-22

## 2024-03-22 VITALS
BODY MASS INDEX: 37.51 KG/M2 | WEIGHT: 225.13 LBS | HEART RATE: 116 BPM | TEMPERATURE: 98.4 F | HEIGHT: 65 IN | DIASTOLIC BLOOD PRESSURE: 72 MMHG | RESPIRATION RATE: 18 BRPM | OXYGEN SATURATION: 99 % | SYSTOLIC BLOOD PRESSURE: 110 MMHG

## 2024-03-22 DIAGNOSIS — G89.29 CHRONIC LEFT-SIDED LOW BACK PAIN WITHOUT SCIATICA: ICD-10-CM

## 2024-03-22 DIAGNOSIS — M62.830 SPASM OF BACK MUSCLES: Primary | ICD-10-CM

## 2024-03-22 DIAGNOSIS — M54.50 CHRONIC LEFT-SIDED LOW BACK PAIN WITHOUT SCIATICA: ICD-10-CM

## 2024-03-22 DIAGNOSIS — F41.9 ANXIETY: ICD-10-CM

## 2024-03-22 DIAGNOSIS — Z30.09 COUNSELING FOR INITIATION OF BIRTH CONTROL METHOD: ICD-10-CM

## 2024-03-22 DIAGNOSIS — M25.532 ACUTE WRIST PAIN, LEFT: ICD-10-CM

## 2024-03-22 DIAGNOSIS — Z30.44 ENCOUNTER FOR SURVEILLANCE OF VAGINAL RING HORMONAL CONTRACEPTIVE DEVICE: ICD-10-CM

## 2024-03-22 DIAGNOSIS — F32.89 OTHER DEPRESSION: ICD-10-CM

## 2024-03-22 DIAGNOSIS — G57.02 PIRIFORMIS SYNDROME, LEFT: ICD-10-CM

## 2024-03-22 PROCEDURE — 99214 OFFICE O/P EST MOD 30 MIN: CPT | Performed by: STUDENT IN AN ORGANIZED HEALTH CARE EDUCATION/TRAINING PROGRAM

## 2024-03-22 RX ORDER — CYCLOBENZAPRINE HCL 5 MG
5 TABLET ORAL 3 TIMES DAILY PRN
Qty: 15 TABLET | Refills: 0 | Status: SHIPPED | OUTPATIENT
Start: 2024-03-22

## 2024-03-22 RX ORDER — ETONOGESTREL AND ETHINYL ESTRADIOL VAGINAL RING .015; .12 MG/D; MG/D
RING VAGINAL
Qty: 3 EACH | Refills: 2 | Status: SHIPPED | OUTPATIENT
Start: 2024-04-03

## 2024-03-22 ASSESSMENT — PATIENT HEALTH QUESTIONNAIRE - PHQ9
5. POOR APPETITE OR OVEREATING: SEVERAL DAYS
SUM OF ALL RESPONSES TO PHQ QUESTIONS 1-9: 11

## 2024-03-22 ASSESSMENT — ANXIETY QUESTIONNAIRES
2. NOT BEING ABLE TO STOP OR CONTROL WORRYING: SEVERAL DAYS
6. BECOMING EASILY ANNOYED OR IRRITABLE: SEVERAL DAYS
GAD7 TOTAL SCORE: 9
3. WORRYING TOO MUCH ABOUT DIFFERENT THINGS: SEVERAL DAYS
5. BEING SO RESTLESS THAT IT IS HARD TO SIT STILL: SEVERAL DAYS
7. FEELING AFRAID AS IF SOMETHING AWFUL MIGHT HAPPEN: NEARLY EVERY DAY
IF YOU CHECKED OFF ANY PROBLEMS ON THIS QUESTIONNAIRE, HOW DIFFICULT HAVE THESE PROBLEMS MADE IT FOR YOU TO DO YOUR WORK, TAKE CARE OF THINGS AT HOME, OR GET ALONG WITH OTHER PEOPLE: VERY DIFFICULT
GAD7 TOTAL SCORE: 9
1. FEELING NERVOUS, ANXIOUS, OR ON EDGE: SEVERAL DAYS

## 2024-03-22 ASSESSMENT — ENCOUNTER SYMPTOMS
BACK PAIN: 1
NERVOUS/ANXIOUS: 1

## 2024-03-22 NOTE — PROGRESS NOTES
Assessment & Plan     Spasm of back muscles  Chronic left-sided low back pain without sciatica  Piriformis syndrome, left  Acute wrist pain, left  Discussed options for management. Patient opting for trial of muscle relaxants primarily for back and piriformis up to TID, but can use at bedtime only if desires and if cause drowsiness. Also recommend stretching and exercise. Also recommend NSAIDS, topical ordered, ice/heat as desired. For wrist will additionally  a wrist splint and trial during sleep. Will trial until follow-up for mood. If no improvement discussed option for PT. Can opt for this prior to then or at that time based on preference.    - cyclobenzaprine (FLEXERIL) 5 MG tablet; Take 1 tablet (5 mg) by mouth 3 times daily as needed for muscle spasms  - diclofenac (VOLTAREN) 1 % topical gel; Apply 2 g topically 4 times daily    Encounter for surveillance of vaginal ring hormonal contraceptive device  Happy with this form of contraception. Will send 1 year of refills. Follow-up for next annual or for [facility] to get further fills.   - etonogestrel-ethinyl estradiol (NUVARING)  0.12-0.015 MG/24HR vaginal ring    Anxiety  Other depression  Follow-up planned to discuss medication and counseling. Reviewed return precautions. Patient counseled, follow up recommended and ordered. Patient declines immediate management in preference for management of back and wrist concerns today as considers these more acute.  - PRIMARY CARE FOLLOW-UP SCHEDULING; Future    Subjective   Rosario is a 28 year old, presenting for the following health issues:  Contraception, Anxiety, Musculoskeletal Problem, and Back Pain        3/22/2024     2:24 PM   Additional Questions   Roomed by Stacy RODNEY MA     Contraception    Anxiety    Musculoskeletal Problem    Back Pain     History of Present Illness       Reason for visit:  Follow up from previous visit    She eats 0-1 servings of fruits and vegetables daily.She consumes 0 sweetened  beverage(s) daily.She exercises with enough effort to increase her heart rate 10 to 19 minutes per day.  She exercises with enough effort to increase her heart rate 4 days per week.   She is taking medications regularly.     Concern - back pain and wrist pain  Onset: 2.5 weeks for back pain    last week for wrist  Description: L buttcheek down the back of thigh       Intensity: moderate, severe  sciatica worse with movement, 10/10 at worse with 15 minutes of walking or sitting, not much makes better. Dealt with it for 12 years. Has tried heat, ice, voltaren, ibuprofen, naproxen. 2 weeks ago had flu, thinks maybe the frequent coughing and body tensing may have caused a flare up. 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 Did help, took at night.   Progression of Symptoms:  worsening  Accompanying Signs & Symptoms: none  Previous history of similar problem: back problems from 2012  Precipitating factors:        Worsened by: movement  Alleviating factors:        Improved by: nothing  Therapies tried and outcome: None    Concern - back pain and wrist pain  Onset: last week for wrist  Description: L hurts when moving wrist  Intensity: moderate, severe  worse with movement, 10/10 at worse when first wakes up. 7/8 through out day. Side where dhe holds son, he hates being held on right side. Broke left pinky through hand bones. Pain is mainly central and medial dorsal side of hand, area of base of thumb. Only thing can think of is that she is on her fists with wrists straight when having sex. Doesn't recall any injuries Does know that Has tried heat, ice, voltaren, ibuprofen, naproxen. 2 weeks ago had flu, thinks maybe the frequent coughing and body tensing may have caused a flare up. 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 Did help, took at night.   Progression of Symptoms:  worsening  Accompanying Signs & Symptoms: none  Previous history of similar problem: back problems from 2012  Precipitating factors:  "       Worsened by: movement  Alleviating factors:        Improved by: nothing  Therapies tried and outcome: Nonet     Does have anxiety and depression  Would like to schedule to manage this at later date.  Can handle at present, is not unmanageable on a daily basis.  Will consider counseling and medication.  Plan to schedule follow-up, would be interested in virtual visit.    Vaginal Ring  Likes the ring. Would like to continue this for BC.  Discussed will send in 1 years worth.  Can call in if decides would like to try the 13 cycle annovera at any point.  Otherwise, follow-up for annual to get further refills.       Review of Systems  Negative unless otherwise specified per HPI.        Objective    /72   Pulse 116   Temp 98.4  F (36.9  C) (Temporal)   Resp 18   Ht 1.638 m (5' 4.5\")   Wt 102.1 kg (225 lb 2 oz)   LMP 02/29/2024   SpO2 99%   BMI 38.05 kg/m    Body mass index is 38.05 kg/m .  Physical Exam   GENERAL: alert and no acute distress  EYES: Eyes grossly normal to inspection, PERRL and conjunctivae and sclerae normal  HENT: nose and mouth without ulcers or lesions  RESP: normal rate and effort  CV: regular rate, no peripheral edema  ABDOMEN: soft, nontender, non-distended  SKIN: no suspicious lesions or rashes  NEURO: Normal strength and tone, mentation intact and speech normal  PSYCH: mentation appears normal, affect normal/bright  BACK: ttp overlying b/l paraspinal muscles from L3-l5, no tightness noted at these levels though some L rotation. Some mild muscle tighntness/spasm of LLB isolated to region lateral to paraspinals.  LOWER EXTREMITIES: RLE FROM with no abnormal special tests, no ttp/pain, full strength and sensation. LLE with - SLR, -LR, FROM. Primary abnormality was note of pain isolated to mid gluteal region consistent with the location of the piriformis. This was recapitulated with ANIL's test. No radiation of pain was noted with any of the above tests and at all times any " positive pain remained local to the piriformis.  L wrist: FROM, ttp overlying base of thumb and dorsal area of radiocarpal joint. Tinels neg. No numbness or tingling. No overlying skin changes. No erythema/swelling/bruising. No weakness.         Signed Electronically by: Liya Garcia DO

## 2024-03-22 NOTE — NURSING NOTE
See telephone encounter. Patient left without getting scheduled for follow up appointment. Called and LM for patient to call back.   Ashley Kang MA

## 2024-03-22 NOTE — TELEPHONE ENCOUNTER
Called and LM for patient to call back. Please help schedule follow up appointment per note below from patient visit today with Dr. Garcia. Patient left before we could schedule.     Ashley Kang MA       Check-out Note   Please schedule for follow-up, virtual appt to discuss depression and anxiety in 1 month. Then can go,

## 2024-03-25 ENCOUNTER — TELEPHONE (OUTPATIENT)
Dept: FAMILY MEDICINE | Facility: CLINIC | Age: 29
End: 2024-03-25
Payer: COMMERCIAL

## 2024-03-25 NOTE — TELEPHONE ENCOUNTER
Reason for Call:  Appointment Request    Patient requesting this type of appt: Chronic Diease Management/Medication/Follow-Up    Requested provider: Dr Liya Jones    Reason patient unable to be scheduled: Needs to be scheduled by clinic    When does patient want to be seen/preferred time:  1 month    Comments: Pt calling to schedule her Follow up with Dr Liya Jones, unable to find openings.     Could we send this information to you in [employer] or would you prefer to receive a phone call?:   Patient would prefer a phone call   Okay to leave a detailed message?: Yes at Home number on file [phone] (home)    Call taken on 3/25/2024 at 10:50 AM by Hanane Ledezma

## 2024-03-26 ENCOUNTER — MYC MEDICAL ADVICE (OUTPATIENT)
Dept: FAMILY MEDICINE | Facility: CLINIC | Age: 29
End: 2024-03-26
Payer: COMMERCIAL

## 2024-04-07 ENCOUNTER — APPOINTMENT (OUTPATIENT)
Dept: ULTRASOUND IMAGING | Facility: CLINIC | Age: 29
End: 2024-04-07
Attending: STUDENT IN AN ORGANIZED HEALTH CARE EDUCATION/TRAINING PROGRAM
Payer: COMMERCIAL

## 2024-04-07 ENCOUNTER — HOSPITAL ENCOUNTER (EMERGENCY)
Facility: CLINIC | Age: 29
Discharge: HOME | End: 2024-04-07
Attending: STUDENT IN AN ORGANIZED HEALTH CARE EDUCATION/TRAINING PROGRAM | Admitting: STUDENT IN AN ORGANIZED HEALTH CARE EDUCATION/TRAINING PROGRAM
Payer: COMMERCIAL

## 2024-04-07 ENCOUNTER — APPOINTMENT (OUTPATIENT)
Dept: CT IMAGING | Facility: CLINIC | Age: 29
End: 2024-04-07
Attending: STUDENT IN AN ORGANIZED HEALTH CARE EDUCATION/TRAINING PROGRAM
Payer: COMMERCIAL

## 2024-04-07 VITALS
RESPIRATION RATE: 20 BRPM | SYSTOLIC BLOOD PRESSURE: 124 MMHG | TEMPERATURE: 98.1 F | OXYGEN SATURATION: 100 % | WEIGHT: 226 LBS | DIASTOLIC BLOOD PRESSURE: 74 MMHG | HEART RATE: 93 BPM | HEIGHT: 63 IN | BODY MASS INDEX: 40.04 KG/M2

## 2024-04-07 DIAGNOSIS — R91.1 PULMONARY NODULE: ICD-10-CM

## 2024-04-07 DIAGNOSIS — R10.11 ABDOMINAL PAIN, RIGHT UPPER QUADRANT: ICD-10-CM

## 2024-04-07 LAB
ALBUMIN SERPL BCG-MCNC: 4.3 G/DL (ref 3.5–5.2)
ALBUMIN UR-MCNC: NEGATIVE MG/DL
ALP SERPL-CCNC: 81 U/L (ref 40–150)
ALT SERPL W P-5'-P-CCNC: 32 U/L (ref 0–50)
ANION GAP SERPL CALCULATED.3IONS-SCNC: 12 MMOL/L (ref 7–15)
APPEARANCE UR: CLEAR
AST SERPL W P-5'-P-CCNC: 24 U/L (ref 0–45)
BASOPHILS # BLD AUTO: 0.1 10E3/UL (ref 0–0.2)
BASOPHILS NFR BLD AUTO: 1 %
BILIRUB SERPL-MCNC: 0.5 MG/DL
BILIRUB UR QL STRIP: NEGATIVE
BUN SERPL-MCNC: 13.3 MG/DL (ref 6–20)
CALCIUM SERPL-MCNC: 9.6 MG/DL (ref 8.6–10)
CHLORIDE SERPL-SCNC: 100 MMOL/L (ref 98–107)
COLOR UR AUTO: YELLOW
CREAT SERPL-MCNC: 0.78 MG/DL (ref 0.51–0.95)
DEPRECATED HCO3 PLAS-SCNC: 24 MMOL/L (ref 22–29)
EGFRCR SERPLBLD CKD-EPI 2021: >90 ML/MIN/1.73M2
EOSINOPHIL # BLD AUTO: 0.5 10E3/UL (ref 0–0.7)
EOSINOPHIL NFR BLD AUTO: 5 %
ERYTHROCYTE [DISTWIDTH] IN BLOOD BY AUTOMATED COUNT: 13.7 % (ref 10–15)
GLUCOSE SERPL-MCNC: 133 MG/DL (ref 70–99)
GLUCOSE UR STRIP-MCNC: NEGATIVE MG/DL
HCG UR QL: NEGATIVE
HCT VFR BLD AUTO: 45.5 % (ref 35–47)
HGB BLD-MCNC: 14.8 G/DL (ref 11.7–15.7)
HGB UR QL STRIP: NEGATIVE
IMM GRANULOCYTES # BLD: 0 10E3/UL
IMM GRANULOCYTES NFR BLD: 0 %
KETONES UR STRIP-MCNC: NEGATIVE MG/DL
LEUKOCYTE ESTERASE UR QL STRIP: NEGATIVE
LIPASE SERPL-CCNC: 25 U/L (ref 13–60)
LYMPHOCYTES # BLD AUTO: 2.3 10E3/UL (ref 0.8–5.3)
LYMPHOCYTES NFR BLD AUTO: 26 %
MCH RBC QN AUTO: 28.1 PG (ref 26.5–33)
MCHC RBC AUTO-ENTMCNC: 32.5 G/DL (ref 31.5–36.5)
MCV RBC AUTO: 86 FL (ref 78–100)
MONOCYTES # BLD AUTO: 0.6 10E3/UL (ref 0–1.3)
MONOCYTES NFR BLD AUTO: 7 %
MUCOUS THREADS #/AREA URNS LPF: PRESENT /LPF
NEUTROPHILS # BLD AUTO: 5.5 10E3/UL (ref 1.6–8.3)
NEUTROPHILS NFR BLD AUTO: 61 %
NITRATE UR QL: NEGATIVE
NRBC # BLD AUTO: 0 10E3/UL
NRBC BLD AUTO-RTO: 0 /100
PH UR STRIP: 6 [PH] (ref 5–7)
PLATELET # BLD AUTO: 336 10E3/UL (ref 150–450)
POTASSIUM SERPL-SCNC: 3.9 MMOL/L (ref 3.4–5.3)
PROT SERPL-MCNC: 7.8 G/DL (ref 6.4–8.3)
RBC # BLD AUTO: 5.27 10E6/UL (ref 3.8–5.2)
RBC URINE: 0 /HPF
SODIUM SERPL-SCNC: 136 MMOL/L (ref 135–145)
SP GR UR STRIP: 1.02 (ref 1–1.03)
SQUAMOUS EPITHELIAL: 1 /HPF
UROBILINOGEN UR STRIP-MCNC: NORMAL MG/DL
WBC # BLD AUTO: 9 10E3/UL (ref 4–11)
WBC URINE: 0 /HPF

## 2024-04-07 PROCEDURE — 76705 ECHO EXAM OF ABDOMEN: CPT

## 2024-04-07 PROCEDURE — 250N000009 HC RX 250: Performed by: STUDENT IN AN ORGANIZED HEALTH CARE EDUCATION/TRAINING PROGRAM

## 2024-04-07 PROCEDURE — 36415 COLL VENOUS BLD VENIPUNCTURE: CPT | Performed by: STUDENT IN AN ORGANIZED HEALTH CARE EDUCATION/TRAINING PROGRAM

## 2024-04-07 PROCEDURE — 99284 EMERGENCY DEPT VISIT MOD MDM: CPT | Performed by: STUDENT IN AN ORGANIZED HEALTH CARE EDUCATION/TRAINING PROGRAM

## 2024-04-07 PROCEDURE — 85025 COMPLETE CBC W/AUTO DIFF WBC: CPT | Performed by: STUDENT IN AN ORGANIZED HEALTH CARE EDUCATION/TRAINING PROGRAM

## 2024-04-07 PROCEDURE — 83690 ASSAY OF LIPASE: CPT | Performed by: STUDENT IN AN ORGANIZED HEALTH CARE EDUCATION/TRAINING PROGRAM

## 2024-04-07 PROCEDURE — 250N000011 HC RX IP 250 OP 636: Performed by: STUDENT IN AN ORGANIZED HEALTH CARE EDUCATION/TRAINING PROGRAM

## 2024-04-07 PROCEDURE — 99285 EMERGENCY DEPT VISIT HI MDM: CPT | Mod: 25 | Performed by: STUDENT IN AN ORGANIZED HEALTH CARE EDUCATION/TRAINING PROGRAM

## 2024-04-07 PROCEDURE — 81001 URINALYSIS AUTO W/SCOPE: CPT | Performed by: STUDENT IN AN ORGANIZED HEALTH CARE EDUCATION/TRAINING PROGRAM

## 2024-04-07 PROCEDURE — 81025 URINE PREGNANCY TEST: CPT | Performed by: STUDENT IN AN ORGANIZED HEALTH CARE EDUCATION/TRAINING PROGRAM

## 2024-04-07 PROCEDURE — 74177 CT ABD & PELVIS W/CONTRAST: CPT

## 2024-04-07 PROCEDURE — 80053 COMPREHEN METABOLIC PANEL: CPT | Performed by: STUDENT IN AN ORGANIZED HEALTH CARE EDUCATION/TRAINING PROGRAM

## 2024-04-07 RX ORDER — DICYCLOMINE HCL 20 MG
20 TABLET ORAL 2 TIMES DAILY
Qty: 20 TABLET | Refills: 0 | Status: SHIPPED | OUTPATIENT
Start: 2024-04-07 | End: 2024-04-17

## 2024-04-07 RX ORDER — IOPAMIDOL 755 MG/ML
500 INJECTION, SOLUTION INTRAVASCULAR ONCE
Status: COMPLETED | OUTPATIENT
Start: 2024-04-07 | End: 2024-04-07

## 2024-04-07 RX ORDER — ONDANSETRON 4 MG/1
4 TABLET, ORALLY DISINTEGRATING ORAL EVERY 8 HOURS PRN
Qty: 10 TABLET | Refills: 0 | Status: SHIPPED | OUTPATIENT
Start: 2024-04-07 | End: 2024-04-10

## 2024-04-07 RX ADMIN — SODIUM CHLORIDE 60 ML: 9 INJECTION, SOLUTION INTRAVENOUS at 20:12

## 2024-04-07 RX ADMIN — IOPAMIDOL 100 ML: 755 INJECTION, SOLUTION INTRAVENOUS at 20:12

## 2024-04-07 ASSESSMENT — COLUMBIA-SUICIDE SEVERITY RATING SCALE - C-SSRS
2. HAVE YOU ACTUALLY HAD ANY THOUGHTS OF KILLING YOURSELF IN THE PAST MONTH?: NO
1. IN THE PAST MONTH, HAVE YOU WISHED YOU WERE DEAD OR WISHED YOU COULD GO TO SLEEP AND NOT WAKE UP?: NO
6. HAVE YOU EVER DONE ANYTHING, STARTED TO DO ANYTHING, OR PREPARED TO DO ANYTHING TO END YOUR LIFE?: NO

## 2024-04-07 ASSESSMENT — ACTIVITIES OF DAILY LIVING (ADL)
ADLS_ACUITY_SCORE: 35
ADLS_ACUITY_SCORE: 35
ADLS_ACUITY_SCORE: 33
ADLS_ACUITY_SCORE: 35

## 2024-04-07 NOTE — ED TRIAGE NOTES
Comes in with lower right quadrant pain that started this evening.  She states her family has gallbladder and appendix issues.      Triage Assessment (Adult)       Row Name 04/07/24 2810          Triage Assessment    Airway WDL WDL        Respiratory WDL    Respiratory WDL WDL        Skin Circulation/Temperature WDL    Skin Circulation/Temperature WDL WDL        Cardiac WDL    Cardiac WDL WDL        Cognitive/Neuro/Behavioral WDL    Cognitive/Neuro/Behavioral WDL WDL

## 2024-04-07 NOTE — MEDICATION SCRIBE - ADMISSION MEDICATION HISTORY
Medication Scribe Admission Medication History    Admission medication history is complete. The information provided in this note is only as accurate as the sources available at the time of the update.    Information Source(s): Patient and CareEverywhere/SureScripts via in-person    Pertinent Information: n/a    Changes made to PTA medication list:  Added: tums  Deleted: flonase  Changed: None    Allergies reviewed with patient and updates made in EHR: yes    Medication History Completed By: FROY COOPER 4/7/2024 6:32 PM    PTA Med List   Medication Sig Last Dose    calcium carbonate antacid (TUMS ULTRA 1000) 1000 MG CHEW Take 3,000 mg by mouth once as needed (abdominal pain) 4/7/2024 at 1600    cyclobenzaprine (FLEXERIL) 5 MG tablet Take 1 tablet (5 mg) by mouth 3 times daily as needed for muscle spasms 4/6/2024 at 1930    diclofenac (VOLTAREN) 1 % topical gel Apply 2 g topically 4 times daily Past Month at [facility]    etonogestrel-ethinyl estradiol (NUVARING) 0.12-0.015 MG/24HR vaginal ring Insert one (1) ring vaginally and leave in place for 3 consecutive weeks (21 days), then remove for 1 week. Past Month at [facility]

## 2024-04-07 NOTE — Clinical Note
Rosario Gibson was seen and treated in our emergency department on 4/7/2024.  She may return to work on 04/09/2024.       If you have any questions or concerns, please don't hesitate to call.      Harris Knox MD

## 2024-04-07 NOTE — ED PROVIDER NOTES
History     Chief Complaint   Patient presents with    Abdominal Pain     HPI  Rosario Gibson is a 28 year old female with history of morbid obesity who presents for evaluation of abdominal pain and nausea.  This afternoon, about an hour prior to arrival, patient developed some nausea and dry heaving.  This was followed shortly thereafter by very sharp and severe right upper/mid abdominal pain.  Since then she has had a dull but consistent discomfort to the same area that radiates somewhat into her back.  Apparently her family has a history of gallbladder problems, particularly after childbirth (she gave birth in May).  Patient had just eaten [employer] prior to onset of symptoms.  She otherwise feels well, denies fevers, chest pain or shortness of breath, changes in bowel or urinary habits, other complaints today.  Denies possibility of pregnancy, states most recent menstrual cycle was 3 days ago.    Allergies:  No Known Allergies    Problem List:    Patient Active Problem List    Diagnosis Date Noted    Obesity, morbid (more than 100 lbs over ideal weight or BMI > 40) (H) 06/26/2013     Priority: Medium    Tobacco abuse 06/26/2013     Priority: Medium    Acne 03/13/2013     Priority: Medium        Past Medical History:    Past Medical History:   Diagnosis Date    Acne     Allergic rhinitis     Obesity        Past Surgical History:    Past Surgical History:   Procedure Laterality Date    NO HISTORY OF SURGERY         Family History:    Family History   Problem Relation Age of Onset    Diabetes Paternal Grandmother     Hypertension Maternal Grandmother        Social History:  Marital Status:  Single [1]  Social History     Tobacco Use    Smoking status: Some Days     Packs/day: .3     Types: Cigarettes    Smokeless tobacco: Never   Vaping Use    Vaping Use: Never used   Substance Use Topics    Alcohol use: No     Alcohol/week: 0.0 standard drinks of alcohol    Drug use: No        Medications:    calcium  "carbonate antacid (TUMS ULTRA 1000) 1000 MG CHEW  cyclobenzaprine (FLEXERIL) 5 MG tablet  diclofenac (VOLTAREN) 1 % topical gel  dicyclomine (BENTYL) 20 MG tablet  etonogestrel-ethinyl estradiol (NUVARING) 0.12-0.015 MG/24HR vaginal ring  ondansetron (ZOFRAN ODT) 4 MG ODT tab      Review of Systems   All other systems reviewed and are negative.  See HPI.    Physical Exam   BP: (!) 135/92  Pulse: 93  Temp: 98.1  F (36.7  C)  Resp: 20  Height: 160 cm (5' 3\")  Weight: 102.5 kg (226 lb)  SpO2: 100 %    Physical Exam  Vitals and nursing note reviewed.   Constitutional:       General: She is not in acute distress.     Appearance: Normal appearance. She is obese. She is not diaphoretic.      Comments: Slightly uncomfortable due to pain.  Otherwise nontoxic.   HENT:      Head: Atraumatic.      Mouth/Throat:      Mouth: Mucous membranes are moist.   Eyes:      General: No scleral icterus.     Conjunctiva/sclera: Conjunctivae normal.   Cardiovascular:      Rate and Rhythm: Normal rate.      Heart sounds: Normal heart sounds.   Pulmonary:      Effort: No respiratory distress.      Breath sounds: Normal breath sounds.   Abdominal:      General: Abdomen is flat. Bowel sounds are normal. There is no distension.      Palpations: Abdomen is soft.      Tenderness: There is abdominal tenderness in the right upper quadrant and epigastric area. There is no right CVA tenderness, left CVA tenderness or guarding. Positive signs include Cameron's sign. Negative signs include McBurney's sign.   Musculoskeletal:      Cervical back: Neck supple.   Skin:     General: Skin is warm.      Capillary Refill: Capillary refill takes less than 2 seconds.      Coloration: Skin is not jaundiced.      Findings: No rash.   Neurological:      Mental Status: She is alert and oriented to person, place, and time.   Psychiatric:         Mood and Affect: Mood normal.         ED Course        Procedures            Results for orders placed or performed during the " hospital encounter of 04/07/24 (from the past 24 hour(s))   CBC with platelets differential    Narrative    The following orders were created for panel order CBC with platelets differential.  Procedure                               Abnormality         Status                     ---------                               -----------         ------                     CBC with platelets and d...[[id number]]  Abnormal            Final result                 Please view results for these tests on the individual orders.   Comprehensive metabolic panel   Result Value Ref Range    Sodium 136 135 - 145 mmol/L    Potassium 3.9 3.4 - 5.3 mmol/L    Carbon Dioxide (CO2) 24 22 - 29 mmol/L    Anion Gap 12 7 - 15 mmol/L    Urea Nitrogen 13.3 6.0 - 20.0 mg/dL    Creatinine 0.78 0.51 - 0.95 mg/dL    GFR Estimate >90 >60 mL/min/1.73m2    Calcium 9.6 8.6 - 10.0 mg/dL    Chloride 100 98 - 107 mmol/L    Glucose 133 (H) 70 - 99 mg/dL    Alkaline Phosphatase 81 40 - 150 U/L    AST 24 0 - 45 U/L    ALT 32 0 - 50 U/L    Protein Total 7.8 6.4 - 8.3 g/dL    Albumin 4.3 3.5 - 5.2 g/dL    Bilirubin Total 0.5 <=1.2 mg/dL   Lipase   Result Value Ref Range    Lipase 25 13 - 60 U/L   HCG qualitative urine   Result Value Ref Range    hCG Urine Qualitative Negative Negative   UA with Microscopic reflex to Culture    Specimen: Urine, Clean Catch   Result Value Ref Range    Color Urine Yellow Colorless, Straw, Light Yellow, Yellow    Appearance Urine Clear Clear    Glucose Urine Negative Negative mg/dL    Bilirubin Urine Negative Negative    Ketones Urine Negative Negative mg/dL    Specific Gravity Urine 1.020 1.003 - 1.035    Blood Urine Negative Negative    pH Urine 6.0 5.0 - 7.0    Protein Albumin Urine Negative Negative mg/dL    Urobilinogen Urine Normal Normal, 2.0 mg/dL    Nitrite Urine Negative Negative    Leukocyte Esterase Urine Negative Negative    Mucus Urine Present (A) None Seen /LPF    RBC Urine 0 <=2 /HPF    WBC Urine 0 <=5 /HPF    Squamous  Epithelials Urine 1 <=1 /HPF    Narrative    Urine Culture not indicated   CBC with platelets and differential   Result Value Ref Range    WBC Count 9.0 4.0 - 11.0 10e3/uL    RBC Count 5.27 (H) 3.80 - 5.20 10e6/uL    Hemoglobin 14.8 11.7 - 15.7 g/dL    Hematocrit 45.5 35.0 - 47.0 %    MCV 86 78 - 100 fL    MCH 28.1 26.5 - 33.0 pg    MCHC 32.5 31.5 - 36.5 g/dL    RDW 13.7 10.0 - 15.0 %    Platelet Count 336 150 - 450 10e3/uL    % Neutrophils 61 %    % Lymphocytes 26 %    % Monocytes 7 %    % Eosinophils 5 %    % Basophils 1 %    % Immature Granulocytes 0 %    NRBCs per 100 WBC 0 <1 /100    Absolute Neutrophils 5.5 1.6 - 8.3 10e3/uL    Absolute Lymphocytes 2.3 0.8 - 5.3 10e3/uL    Absolute Monocytes 0.6 0.0 - 1.3 10e3/uL    Absolute Eosinophils 0.5 0.0 - 0.7 10e3/uL    Absolute Basophils 0.1 0.0 - 0.2 10e3/uL    Absolute Immature Granulocytes 0.0 <=0.4 10e3/uL    Absolute NRBCs 0.0 10e3/uL   US Abdomen Limited    Narrative    EXAM: US ABDOMEN LIMITED  LOCATION: [facility]  DATE: 4/7/2024    INDICATION: Colicky right upper quadrant pain.  COMPARISON: None.  TECHNIQUE: Limited abdominal ultrasound.    FINDINGS:    GALLBLADDER: Decompressed gallbladder, though no echogenic, shadowing stones seen.    BILE DUCTS: No biliary dilatation. The common duct measures 4 mm.    LIVER: Echogenic parenchyma with smooth contour. Hypoechoic area adjacent to the gallbladder fossa. Otherwise, no focal mass.    RIGHT KIDNEY: No hydronephrosis.    PANCREAS: The visualized portions are normal.    No ascites.      Impression    IMPRESSION:    1.  Moderate hepatic steatosis.    2.  Hypoechoic area adjacent to the gallbladder fossa is presumed focal fatty sparing.    3.  No cholelithiasis identified.     CT Abdomen Pelvis w Contrast    Narrative    EXAM: CT ABDOMEN PELVIS W CONTRAST  LOCATION: [facility]  DATE: 4/7/2024    INDICATION: Right mid abdominal pain, hypoechoic area  adjacent to gallbladder seen on ultrasound  COMPARISON: None.  TECHNIQUE: CT scan of the abdomen and pelvis was performed following injection of IV contrast. Multiplanar reformats were obtained. Dose reduction techniques were used.  CONTRAST: 100 mL Isovue 370    FINDINGS:   LOWER CHEST: There is a 10 mm nodule in the inferior lingula with subtle spiculation peripherally and low-density internally.    HEPATOBILIARY: Gallbladder is collapsed. Liver unremarkable. No CT correlate for recent sonographic finding.    PANCREAS: Normal.    SPLEEN: Normal.    ADRENAL GLANDS: Normal.    KIDNEYS/BLADDER: There is a nonobstructing 1 cm stone in the upper pole of the left kidney. No renal mass or hydronephrosis. No hydroureter or ureteral calcification. No bladder wall thickening or calcification.    BOWEL: No free air, free fluid or inflammatory change. Normal caliber appendix.    LYMPH NODES: Normal.    VASCULATURE: Normal.    PELVIC ORGANS: Normal.    MUSCULOSKELETAL: Normal.      Impression    IMPRESSION:   1.  No acute abnormality in the abdomen or pelvis.  2.  Nonobstructing 1 cm left renal stone.  3.  Incidental nodule within the inferior lingula. Follow-up follow-up nonurgent/routine chest CT recommended in 3 months.    REFERENCE:  Guidelines for Management of Incidental Pulmonary Nodules Detected on CT Images: From the Fleischner Society 2017.   Guidelines apply to incidental nodules in patients who are 35 years or older.  Guidelines do not apply to lung cancer screening, patients with immunosuppression, or patients with known primary cancer.    SINGLE NODULE  Nodule size <6 mm  Low-risk patients: No follow-up needed.  High-risk patients: Optional follow-up at 12 months.    Nodule size 6-8 mm  Low-risk patients: Follow-up CT at 6-12 months, then consider CT at 18-24 months.  High-risk patients: Follow-up CT at 6-12 months, then at 18-24 months if no change.    Nodule size >8 mm  Either low or high-risk  patients:  Consider CT, PET/CT, or tissue sampling at 3 months.    Consider referral to lung nodule clinic.       Medications   iopamidol (ISOVUE-370) solution 500 mL (100 mLs Intravenous $Given 4/7/24 2012)   sodium chloride 0.9 % bag 100mL for CT scan flush use (60 mLs Intravenous $Given 4/7/24 2012)     Assessments & Plan (with Medical Decision Making)     I have reviewed the nursing notes.    I have reviewed the findings, diagnosis, plan and need for follow up with the patient.    Medical Decision Making  Rosario Gibson is a 28 year old female with history of morbid obesity who presents for evaluation of abdominal pain and nausea.  Normal vitals on arrival.  Exam is significant for very mild right upper quadrant and right mid abdominal tenderness.  Cameron sign is positive.  She has no rebound or guarding.  I think her symptoms are probably due to gallbladder pathology, but given location of pain, differential would also include pancreatitis, appendicitis, kidney stone/pyelonephritis, among others.  Patient declined any pain or nausea medication.  Labs were very reassuring; she has no leukocytosis or anemia, electrolytes and transaminases were within normal limits.  Pregnancy test was negative and urinalysis showed no acute abnormalities.  We did discuss a few different options for initial evaluation and she wanted to start with an ultrasound.  This showed moderate hepatic steatosis and also a hypoechoic area adjacent to the gallbladder fossa of presumed focal fatty sparing, unclear etiology.  She had no cholelithiasis or any signs of cholecystitis.  Due to mild persistent right-sided pain, we elected to continue with CT scan of the abdomen.  This showed no acute abnormalities in the abdomen or pelvis aside from a nonobstructing 1 cm left kidney stone.  Incidentally, she was noted to have a 1 cm spiculated nodule in the lower lung.  I reviewed this finding specifically with the patient and recommended  follow-up CT scan as recommended by radiology.  Patient stated she would discuss this with her primary care doctor and arrange for imaging.  In regard to her abdominal pain, I still think it is probably related to spasms of the gallbladder.  Will start by prescribing her Bentyl and trying some dietary changes.  Patient will follow-up with her primary care doctor and agrees to return sooner for any new or acutely worsening symptoms.    New Prescriptions    DICYCLOMINE (BENTYL) 20 MG TABLET    Take 1 tablet (20 mg) by mouth 2 times daily for 10 days    ONDANSETRON (ZOFRAN ODT) 4 MG ODT TAB    Take 1 tablet (4 mg) by mouth every 8 hours as needed for nausea       Final diagnoses:   Abdominal pain, right upper quadrant   Pulmonary nodule       4/7/2024   [facility] EMERGENCY DEPT       Harris Knox MD  04/07/24 2058

## 2024-04-08 ENCOUNTER — TELEPHONE (OUTPATIENT)
Dept: FAMILY MEDICINE | Facility: CLINIC | Age: 29
End: 2024-04-08
Payer: COMMERCIAL

## 2024-04-08 NOTE — TELEPHONE ENCOUNTER
See ER notes from 4/7/24:    Medical Decision Making  Rosario Gibson is a 28 year old female with history of morbid obesity who presents for evaluation of abdominal pain and nausea.  Normal vitals on arrival.  Exam is significant for very mild right upper quadrant and right mid abdominal tenderness.  Cameron sign is positive.  She has no rebound or guarding.  I think her symptoms are probably due to gallbladder pathology, but given location of pain, differential would also include pancreatitis, appendicitis, kidney stone/pyelonephritis, among others.  Patient declined any pain or nausea medication.  Labs were very reassuring; she has no leukocytosis or anemia, electrolytes and transaminases were within normal limits.  Pregnancy test was negative and urinalysis showed no acute abnormalities.  We did discuss a few different options for initial evaluation and she wanted to start with an ultrasound.  This showed moderate hepatic steatosis and also a hypoechoic area adjacent to the gallbladder fossa of presumed focal fatty sparing, unclear etiology.  She had no cholelithiasis or any signs of cholecystitis.  Due to mild persistent right-sided pain, we elected to continue with CT scan of the abdomen.  This showed no acute abnormalities in the abdomen or pelvis aside from a nonobstructing 1 cm left kidney stone.  Incidentally, she was noted to have a 1 cm spiculated nodule in the lower lung.  I reviewed this finding specifically with the patient and recommended follow-up CT scan as recommended by radiology.  Patient stated she would discuss this with her primary care doctor and arrange for imaging.  In regard to her abdominal pain, I still think it is probably related to spasms of the gallbladder.  Will start by prescribing her Bentyl and trying some dietary changes.  Patient will follow-up with her primary care doctor and agrees to return sooner for any new or acutely worsening symptoms.      Routed to RN team  to call patient for follow up.    Genoveva BALDERAS RN  [facility] Triage

## 2024-04-08 NOTE — DISCHARGE INSTRUCTIONS
Your testing today was all very reassuring.  I cannot identify an exact cause of your pain, but I do still think it is related to your gallbladder james.  Prescription for medication was provided which may help that discomfort.  Use as needed.  Separately, you were found to have a small nodule in one of your lungs.  Please discuss this finding with your primary care doctor and schedule a repeat CT scan within the next few months.  Return to the emergency department in the meantime for any new or acutely worsening symptoms.

## 2024-04-10 ENCOUNTER — TELEPHONE (OUTPATIENT)
Dept: FAMILY MEDICINE | Facility: CLINIC | Age: 29
End: 2024-04-10
Payer: COMMERCIAL

## 2024-04-10 NOTE — TELEPHONE ENCOUNTER
Reason for Call:  Appointment Request    Patient requesting this type of appt:  Hospital/ED Follow-Up     Requested provider:  Liya Garcia DO    Reason patient unable to be scheduled: Needs to be scheduled by clinic    When does patient want to be seen/preferred time:  Within next 2 weeks , is possible    Comments: Patient was seen for abdominal pain at [place] ED on 4/7/2024 and needs to schedule a follow up for a 2nd CT due to a spot on her lung. Unable to pull any appointments from primary care provider's schedule. Patient is also ok with addressing this on her 4/26 appointment if possible.     Could we send this information to you in [employer] or would you prefer to receive a phone call?:   Patient would like to be contacted via [employer]    Call taken on 4/10/2024 at 8:39 AM by Hattie Penaloza

## 2024-04-11 ENCOUNTER — MYC MEDICAL ADVICE (OUTPATIENT)
Dept: FAMILY MEDICINE | Facility: CLINIC | Age: 29
End: 2024-04-11
Payer: COMMERCIAL

## 2024-04-11 NOTE — TELEPHONE ENCOUNTER
Patient was last seen by Megan in 2015 and patient is scheduled for a follow up with the provider she has been seeing.     Ashley Gibbons RN

## 2024-04-11 NOTE — TELEPHONE ENCOUNTER
Liya romero DO  [facility] Pool11 hours ago (8:51 PM)     RO  Ok to wait until her appointment. The nodule doesn't need CT follow-up until 3 months after initial CT on 4/7.

## 2024-04-26 ENCOUNTER — OFFICE VISIT (OUTPATIENT)
Dept: FAMILY MEDICINE | Facility: CLINIC | Age: 29
End: 2024-04-26
Payer: COMMERCIAL

## 2024-04-26 VITALS
DIASTOLIC BLOOD PRESSURE: 84 MMHG | OXYGEN SATURATION: 100 % | TEMPERATURE: 98.4 F | HEIGHT: 63 IN | SYSTOLIC BLOOD PRESSURE: 128 MMHG | RESPIRATION RATE: 18 BRPM | BODY MASS INDEX: 39.93 KG/M2 | HEART RATE: 102 BPM | WEIGHT: 225.38 LBS

## 2024-04-26 DIAGNOSIS — R91.8 PULMONARY NODULES: ICD-10-CM

## 2024-04-26 DIAGNOSIS — F32.2 CURRENT SEVERE EPISODE OF MAJOR DEPRESSIVE DISORDER WITHOUT PSYCHOTIC FEATURES, UNSPECIFIED WHETHER RECURRENT (H): Primary | ICD-10-CM

## 2024-04-26 DIAGNOSIS — F41.9 ANXIETY: ICD-10-CM

## 2024-04-26 DIAGNOSIS — Z72.0 TOBACCO ABUSE: ICD-10-CM

## 2024-04-26 PROCEDURE — 96127 BRIEF EMOTIONAL/BEHAV ASSMT: CPT | Performed by: STUDENT IN AN ORGANIZED HEALTH CARE EDUCATION/TRAINING PROGRAM

## 2024-04-26 PROCEDURE — 99215 OFFICE O/P EST HI 40 MIN: CPT | Performed by: STUDENT IN AN ORGANIZED HEALTH CARE EDUCATION/TRAINING PROGRAM

## 2024-04-26 RX ORDER — HYDROXYZINE PAMOATE 25 MG/1
25 CAPSULE ORAL 4 TIMES DAILY PRN
Qty: 120 CAPSULE | Refills: 2 | Status: SHIPPED | OUTPATIENT
Start: 2024-04-26

## 2024-04-26 ASSESSMENT — ANXIETY QUESTIONNAIRES
5. BEING SO RESTLESS THAT IT IS HARD TO SIT STILL: SEVERAL DAYS
7. FEELING AFRAID AS IF SOMETHING AWFUL MIGHT HAPPEN: NEARLY EVERY DAY
4. TROUBLE RELAXING: MORE THAN HALF THE DAYS
GAD7 TOTAL SCORE: 14
1. FEELING NERVOUS, ANXIOUS, OR ON EDGE: MORE THAN HALF THE DAYS
7. FEELING AFRAID AS IF SOMETHING AWFUL MIGHT HAPPEN: NEARLY EVERY DAY
8. IF YOU CHECKED OFF ANY PROBLEMS, HOW DIFFICULT HAVE THESE MADE IT FOR YOU TO DO YOUR WORK, TAKE CARE OF THINGS AT HOME, OR GET ALONG WITH OTHER PEOPLE?: SOMEWHAT DIFFICULT
6. BECOMING EASILY ANNOYED OR IRRITABLE: MORE THAN HALF THE DAYS
IF YOU CHECKED OFF ANY PROBLEMS ON THIS QUESTIONNAIRE, HOW DIFFICULT HAVE THESE PROBLEMS MADE IT FOR YOU TO DO YOUR WORK, TAKE CARE OF THINGS AT HOME, OR GET ALONG WITH OTHER PEOPLE: SOMEWHAT DIFFICULT
2. NOT BEING ABLE TO STOP OR CONTROL WORRYING: MORE THAN HALF THE DAYS
3. WORRYING TOO MUCH ABOUT DIFFERENT THINGS: MORE THAN HALF THE DAYS
GAD7 TOTAL SCORE: 14
GAD7 TOTAL SCORE: 14

## 2024-04-26 ASSESSMENT — PATIENT HEALTH QUESTIONNAIRE - PHQ9
10. IF YOU CHECKED OFF ANY PROBLEMS, HOW DIFFICULT HAVE THESE PROBLEMS MADE IT FOR YOU TO DO YOUR WORK, TAKE CARE OF THINGS AT HOME, OR GET ALONG WITH OTHER PEOPLE: SOMEWHAT DIFFICULT
SUM OF ALL RESPONSES TO PHQ QUESTIONS 1-9: 12
SUM OF ALL RESPONSES TO PHQ QUESTIONS 1-9: 12

## 2024-04-26 ASSESSMENT — PAIN SCALES - GENERAL: PAINLEVEL: MODERATE PAIN (5)

## 2024-04-26 NOTE — PROGRESS NOTES
"  Assessment & Plan     Current severe episode of major depressive disorder without psychotic features, unspecified whether recurrent (H)  Anxiety  - Patient reports depression as worse than anxiety.  - Recurrent major depression, severe.   - Discussed options for counseling, medications, or both and if meds options for agents.  - Patient interested in both.  - Wishing to start with what she responded to previously.  - Recommend starting 50 mg sertraline and hydroxyzine 25 mg PRN up to QID. - Re-evaluation in six weeks virtual visit.   - Counseling recommended, ordering Mental Health referral today.  - sertraline (ZOLOFT) 50 MG tablet; Take 1 tablet (50 mg) by mouth daily  - hydrOXYzine edd (VISTARIL) 25 MG capsule; Take 1 capsule (25 mg) by mouth 4 times daily as needed for anxiety (for anxiety up to 4 times a day)  - Adult Mental Health  Referral; Future    Pulmonary nodules  Incidental Pulmonary Nodule noted in ED on 4/7  - CT scan and ultrasound performed due to stomach pain  - 10mm partially spiculated nodule found in lung  - recommended 3 month follow-up for repeat CT to monitor  - discussed ordering that today in preparation  - no concerning symptoms, but is a smoker  - CT Chest w/o Contrast; Future    Subjective   Rosario is a 28 year old, presenting for the following health issues:   Follow Up        4/26/2024    12:47 PM   Additional Questions   Roomed by Rachel Lee of Present Ill[facility]       Reason for visit:  Depression and anxiety    She eats 2-3 servings of fruits and vegetables daily.She consumes 0 sweetened beverage(s) daily.She exercises with enough effort to increase her heart rate 20 to 29 minutes per day.  She exercises with enough effort to increase her heart rate 4 days per week.   She is taking medications regularly.       Depression and Anxiety   How are you doing with your depression since your last visit? Pt reports, \"Staying the same, some days are worse than " "others.\"  How are you doing with your anxiety since your last visit?  Staying the same per pt.   Are you having other symptoms that might be associated with depression or anxiety? No  Have you had a significant life event? No   Do you have any concerns with your use of alcohol or other drugs? No    - Follow-up visit for anxiety and depression  - Patient reports worsening of symptoms since last visit  - History of major depression, recurrent  - Previous use of sertraline and hydroxyzine for anxiety  - Patient reports depression is worse than anxiety    Social History     Tobacco Use    Smoking status: Some Days     Current packs/day: 0.30     Types: Cigarettes    Smokeless tobacco: Never   Vaping Use    Vaping status: Never Used   Substance Use Topics    Alcohol use: No     Alcohol/week: 0.0 standard drinks of alcohol    Drug use: No         3/22/2024     2:20 PM 4/26/2024    12:41 PM   PHQ   PHQ-9 Total Score 11 12   Q9: Thoughts of better off dead/self-harm past 2 weeks Not at all Not at all         3/22/2024     2:20 PM 4/26/2024    12:41 PM   TRISH-7 SCORE   Total Score  14 (moderate anxiety)   Total Score 9 14         4/26/2024    12:41 PM   Last PHQ-9   1.  Little interest or pleasure in doing things 1   2.  Feeling down, depressed, or hopeless 1   3.  Trouble falling or staying asleep, or sleeping too much 3   4.  Feeling tired or having little energy 3   5.  Poor appetite or overeating 2   6.  Feeling bad about yourself 1   7.  Trouble concentrating 1   8.  Moving slowly or restless 0   Q9: Thoughts of better off dead/self-harm past 2 weeks 0   PHQ-9 Total Score 12         4/26/2024    12:41 PM   TRISH-7    1. Feeling nervous, anxious, or on edge 2   2. Not being able to stop or control worrying 2   3. Worrying too much about different things 2   4. Trouble relaxing 2   5. Being so restless that it is hard to sit still 1   6. Becoming easily annoyed or irritable 2   7. Feeling afraid, as if something awful might " happen 3   TRISH-7 Total Score 14   If you checked any problems, how difficult have they made it for you to do your work, take care of things at home, or get along with other people? Somewhat difficult       Suicide Assessment Five-step Evaluation and Treatment (SAFE-T)            Review of Systems  Negative unless otherwise specified per HPI.      Objective    LMP 02/29/2024   There is no height or weight on file to calculate BMI.  Physical Exam   GENERAL: alert and no acute distress  EYES: Eyes grossly normal to inspection, PERRL and conjunctivae and sclerae normal  HENT: nose and mouth without ulcers or lesions  RESP: normal rate and effort  CV: regular rate, no peripheral edema  ABDOMEN: soft, nontender, non-distended  MS: no gross musculoskeletal defects noted, no edema  SKIN: no suspicious lesions or rashes  NEURO: Normal strength and tone, mentation intact and speech normal  PSYCH: mentation appears normal, affect normal/bright        Signed Electronically by: Liya Garcia DO  A total of 41 minutes were spent on this visit on the day of the encounter, on: chart review, history, assessment, exam, results review, documentation and discussing the assessment and plan as above with the patient.

## 2024-07-12 ENCOUNTER — TELEPHONE (OUTPATIENT)
Dept: FAMILY MEDICINE | Facility: CLINIC | Age: 29
End: 2024-07-12
Payer: COMMERCIAL

## 2024-07-12 ENCOUNTER — HOSPITAL ENCOUNTER (OUTPATIENT)
Dept: CT IMAGING | Facility: CLINIC | Age: 29
Discharge: HOME | End: 2024-07-12
Attending: STUDENT IN AN ORGANIZED HEALTH CARE EDUCATION/TRAINING PROGRAM | Admitting: STUDENT IN AN ORGANIZED HEALTH CARE EDUCATION/TRAINING PROGRAM
Payer: COMMERCIAL

## 2024-07-12 DIAGNOSIS — R91.8 PULMONARY NODULES: ICD-10-CM

## 2024-07-12 LAB — RADIOLOGIST FLAGS: ABNORMAL

## 2024-07-12 PROCEDURE — 71250 CT THORAX DX C-: CPT

## 2024-07-12 NOTE — TELEPHONE ENCOUNTER
DATE/TIME OF CALL RECEIVED FROM LAB:  07/12/24 at 4:27 PM   LAB TEST:  CT CHEST W/O CONTRAST   LAB VALUE:  IMPRESSION: 1.  Enlarged thoracic lymph nodes, which were not included in the field-of-view of comparison abdominopelvic CT. These could be benign/reactive or neoplastic. Consider 3 month follow-up contrast enhanced CT chest, FDG PET/CT, or tissue sampling.   2.  Decreased size of the lingular nodule favoring benignity. If a three-month follow-up CT is performed for #1 this can be reassessed at that time. Otherwise, an additional chest CT in 3-6 months would be recommended.  PROVIDER NOTIFIED?: Yes  PROVIDER NAME: Liya Garcia DO  DATE/TIME LAB VALUE REPORTED TO PROVIDER: 07/12/24 st 4:29 PM  MECHANISM OF PROVIDER NOTIFICATION: Face-To-Face  PROVIDER RESPONSE: Will review  Chrissy Fuentes RN on 7/12/2024 at 4:29 PM

## 2024-07-15 DIAGNOSIS — R91.8 PULMONARY NODULES: Primary | ICD-10-CM

## 2024-07-15 DIAGNOSIS — R59.9 ENLARGED LYMPH NODES: ICD-10-CM

## 2024-07-15 NOTE — PROGRESS NOTES
Placing orders for recommended follow-up CT in 3-6 months.   Primary assessment of thoracic enlarged lymph nodes.   Secondary assessment of pulmonary nodule of lingula with noted interval decrease in size.    Liya Garcia DO

## 2024-10-04 ENCOUNTER — HOSPITAL ENCOUNTER (OUTPATIENT)
Dept: CT IMAGING | Facility: CLINIC | Age: 29
Discharge: HOME | End: 2024-10-04
Attending: STUDENT IN AN ORGANIZED HEALTH CARE EDUCATION/TRAINING PROGRAM | Admitting: STUDENT IN AN ORGANIZED HEALTH CARE EDUCATION/TRAINING PROGRAM
Payer: COMMERCIAL

## 2024-10-04 DIAGNOSIS — R91.8 PULMONARY NODULES: ICD-10-CM

## 2024-10-04 DIAGNOSIS — R59.9 ENLARGED LYMPH NODES: ICD-10-CM

## 2024-10-04 PROCEDURE — 250N000009 HC RX 250: Performed by: STUDENT IN AN ORGANIZED HEALTH CARE EDUCATION/TRAINING PROGRAM

## 2024-10-04 PROCEDURE — 250N000011 HC RX IP 250 OP 636: Performed by: STUDENT IN AN ORGANIZED HEALTH CARE EDUCATION/TRAINING PROGRAM

## 2024-10-04 PROCEDURE — 71260 CT THORAX DX C+: CPT

## 2024-10-04 RX ORDER — IOPAMIDOL 755 MG/ML
500 INJECTION, SOLUTION INTRAVASCULAR ONCE
Status: COMPLETED | OUTPATIENT
Start: 2024-10-04 | End: 2024-10-04

## 2024-10-04 RX ADMIN — IOPAMIDOL 80 ML: 755 INJECTION, SOLUTION INTRAVENOUS at 13:51

## 2024-10-04 RX ADMIN — SODIUM CHLORIDE 60 ML: 9 INJECTION, SOLUTION INTRAVENOUS at 13:50

## 2024-10-19 ENCOUNTER — NURSE TRIAGE (OUTPATIENT)
Dept: NURSING | Facility: CLINIC | Age: 29
End: 2024-10-19
Payer: COMMERCIAL

## 2024-10-19 NOTE — TELEPHONE ENCOUNTER
Patient calling to see about drug interactions between her allergy medication and sudafed.   Patient is wanting to take Sudafed PE along with her cetirizine hydrochloride allergy medication.   [employer] consulted and no drug to drug interactions. Patient is not pregnant or breast feeding.   Informed patient that a pharmacist is also a good source of advice regarding medication interactions, prescribed and OTC.    Reason for Disposition    Caller has medicine question only, adult not sick, AND triager answers question    Protocols used: Medication Question Call-A-

## 2024-12-11 ENCOUNTER — PATIENT OUTREACH (OUTPATIENT)
Dept: CARE COORDINATION | Facility: CLINIC | Age: 29
End: 2024-12-11
Payer: COMMERCIAL

## 2024-12-25 ENCOUNTER — PATIENT OUTREACH (OUTPATIENT)
Dept: CARE COORDINATION | Facility: CLINIC | Age: 29
End: 2024-12-25
Payer: COMMERCIAL

## 2025-02-19 ENCOUNTER — HOSPITAL ENCOUNTER (EMERGENCY)
Facility: CLINIC | Age: 30
Discharge: HOME | End: 2025-02-19
Attending: EMERGENCY MEDICINE | Admitting: EMERGENCY MEDICINE
Payer: COMMERCIAL

## 2025-02-19 VITALS
BODY MASS INDEX: 42.69 KG/M2 | HEIGHT: 62 IN | TEMPERATURE: 98.3 F | WEIGHT: 232 LBS | SYSTOLIC BLOOD PRESSURE: 154 MMHG | HEART RATE: 99 BPM | OXYGEN SATURATION: 100 % | RESPIRATION RATE: 17 BRPM | DIASTOLIC BLOOD PRESSURE: 93 MMHG

## 2025-02-19 DIAGNOSIS — G89.29 CHRONIC LEFT SHOULDER PAIN: ICD-10-CM

## 2025-02-19 DIAGNOSIS — M25.512 CHRONIC LEFT SHOULDER PAIN: ICD-10-CM

## 2025-02-19 DIAGNOSIS — F41.9 ANXIETY: ICD-10-CM

## 2025-02-19 PROCEDURE — 99283 EMERGENCY DEPT VISIT LOW MDM: CPT | Performed by: EMERGENCY MEDICINE

## 2025-02-19 PROCEDURE — 99284 EMERGENCY DEPT VISIT MOD MDM: CPT | Performed by: EMERGENCY MEDICINE

## 2025-02-19 RX ORDER — HYDROXYZINE HYDROCHLORIDE 25 MG/1
25-50 TABLET, FILM COATED ORAL 2 TIMES DAILY PRN
Qty: 60 TABLET | Refills: 0 | Status: SHIPPED | OUTPATIENT
Start: 2025-02-19

## 2025-02-19 ASSESSMENT — COLUMBIA-SUICIDE SEVERITY RATING SCALE - C-SSRS
6. HAVE YOU EVER DONE ANYTHING, STARTED TO DO ANYTHING, OR PREPARED TO DO ANYTHING TO END YOUR LIFE?: NO
1. IN THE PAST MONTH, HAVE YOU WISHED YOU WERE DEAD OR WISHED YOU COULD GO TO SLEEP AND NOT WAKE UP?: NO
2. HAVE YOU ACTUALLY HAD ANY THOUGHTS OF KILLING YOURSELF IN THE PAST MONTH?: NO

## 2025-02-19 ASSESSMENT — ACTIVITIES OF DAILY LIVING (ADL)
ADLS_ACUITY_SCORE: 41
ADLS_ACUITY_SCORE: 41

## 2025-02-19 NOTE — ED PROVIDER NOTES
History     Chief Complaint   Patient presents with    Shoulder Pain    Anxiety     HPI  Rosario Gibson is a 29 year old female who presents for some ongoing left shoulder pain.  She reports she fell on this in 2023 and has continued to have some pain.  Pain is in the posterior region under the scapula.  She was not originally seen for this.  No x-rays she thinks.  She did see her primary who prescribed some Voltaren gel.  She does hold her 2-year-old son with this arm quite a bit.    Also, last night had a panic attack.  She has some hydroxyzine but has not picked this up.  No suicidal or homicidal thoughts.  She does not get out of the house much since the birth of her child.  She only basically spends time with the child, her significant other and her mom.  She will sometimes leave the house once a week.  No maintenance medications.  No regular exercise.    Allergies:  No Known Allergies    Problem List:    Patient Active Problem List    Diagnosis Date Noted    Obesity, morbid (more than 100 lbs over ideal weight or BMI > 40) (H) 06/26/2013     Priority: Medium    Tobacco abuse 06/26/2013     Priority: Medium    Acne 03/13/2013     Priority: Medium        Past Medical History:    Past Medical History:   Diagnosis Date    Acne     Allergic rhinitis     Obesity        Past Surgical History:    Past Surgical History:   Procedure Laterality Date    NO HISTORY OF SURGERY         Family History:    Family History   Problem Relation Age of Onset    Diabetes Paternal Grandmother     Hypertension Maternal Grandmother        Social History:  Marital Status:  Single [1]  Social History     Tobacco Use    Smoking status: Some Days     Current packs/day: 0.30     Types: Cigarettes    Smokeless tobacco: Never   Vaping Use    Vaping status: Never Used   Substance Use Topics    Alcohol use: No     Alcohol/week: 0.0 standard drinks of alcohol    Drug use: No        Medications:    calcium carbonate antacid (TUMS  "ULTRA 1000) 1000 MG CHEW  cyclobenzaprine (FLEXERIL) 5 MG tablet  diclofenac (VOLTAREN) 1 % topical gel  etonogestrel-ethinyl estradiol (NUVARING) 0.12-0.015 MG/24HR vaginal ring  hydrOXYzine edd (VISTARIL) 25 MG capsule  sertraline (ZOLOFT) 50 MG tablet          Review of Systems  All other systems are reviewed and are negative    Physical Exam   BP: (!) 176/111  Pulse: 104  Temp: 98.3  F (36.8  C)  Resp: 17  Height: 157.5 cm (5' 2\")  Weight: 105.2 kg (232 lb)  SpO2: 100 %      Physical Exam  Vitals and nursing note reviewed.   Constitutional:       General: She is not in acute distress.     Appearance: She is well-developed. She is obese. She is not diaphoretic.   HENT:      Head: Normocephalic and atraumatic.      Nose: Nose normal.      Mouth/Throat:      Mouth: Mucous membranes are moist.      Pharynx: Oropharynx is clear.   Eyes:      General: No scleral icterus.     Conjunctiva/sclera: Conjunctivae normal.   Cardiovascular:      Rate and Rhythm: Normal rate and regular rhythm.      Heart sounds: Normal heart sounds. No murmur heard.  Pulmonary:      Effort: Pulmonary effort is normal. No respiratory distress.      Breath sounds: No stridor. No wheezing or rales.   Abdominal:      General: Abdomen is flat. There is no distension.      Palpations: Abdomen is soft. There is no mass.      Tenderness: There is no abdominal tenderness. There is no guarding or rebound.   Musculoskeletal:      Cervical back: Normal range of motion and neck supple.      Comments: Left shoulder reveals no deformity.  No ecchymosis.  No erythema.  Normal range of motion.  Some soft tissue tenderness inferior to the left scapula posteriorly.   Skin:     General: Skin is warm and dry.      Coloration: Skin is not pale.      Findings: No erythema or rash.   Neurological:      General: No focal deficit present.      Mental Status: She is alert.   Psychiatric:         Mood and Affect: Mood normal.         ED Course        Procedures       "            Results for orders placed or performed during the hospital encounter of 02/19/25 (from the past 24 hours)   XR Shoulder Left 3 Views    Narrative    EXAM: XR SHOULDER LEFT G/E 3 VIEWS  LOCATION: [facility]  DATE: 2/19/2025    INDICATION: pain  COMPARISON: None.      Impression    IMPRESSION: Normal joint spaces and alignment. No fracture. No dislocation.       Medications - No data to display    Assessments & Plan (with Medical Decision Making)  29-year-old female with some chronic left shoulder discomfort.  X-rays negative.  Appears more musculoskeletal.  Referred to physical therapy for follow-up.  Also with some significant anxiety and panic attack last night.  She has some hydroxyzine that she can fill.  No suicidal homicidal thoughts.  Have recommended trying to get out of the house and socialize more when she can as well.     I have reviewed the nursing notes.    I have reviewed the findings, diagnosis, plan and need for follow up with the patient.          New Prescriptions    No medications on file       Final diagnoses:   Anxiety   Chronic left shoulder pain       2/19/2025   [facility] EMERGENCY DEPT       Andreas Adair MD  02/19/25 8752

## 2025-02-19 NOTE — DISCHARGE INSTRUCTIONS
May use ibuprofen up to 600 mg 4 times a day for pain.  May also use some Tylenol up to 1000 mg 4 times a day.    Also as we talked about, try to get out of the house.  Recommend rejoining some social group such as book group if you can.  Try to get out for some regular exercise which can help with anxiety.

## 2025-02-19 NOTE — ED TRIAGE NOTES
"Pt drove herself here, has left shoulder pain that's been affecting her the past year but is getting worse.  Left leg numbness last few days  \"Ocular headache\" per pt last night  \"Under my left breast rib pain and general anxiety\"  Told pt we are not a wellness check for MD, she will have to see her primary for all of this.  Had her pin down what was bothering her the most - shoulder pain and anxiety.  States her PCP provided her with anxiety meds, which can be picked up. Told pt she needs to pick those up today.     Triage Assessment (Adult)       Row Name 02/19/25 1257          Triage Assessment    Airway WDL WDL        Respiratory WDL    Respiratory WDL WDL        Cognitive/Neuro/Behavioral WDL    Cognitive/Neuro/Behavioral WDL WDL                     "

## 2025-02-21 PROBLEM — G89.29 CHRONIC LEFT SHOULDER PAIN: Status: ACTIVE | Noted: 2025-02-21

## 2025-02-21 PROBLEM — M25.512 CHRONIC LEFT SHOULDER PAIN: Status: ACTIVE | Noted: 2025-02-21

## 2025-02-23 ENCOUNTER — HEALTH MAINTENANCE LETTER (OUTPATIENT)
Age: 30
End: 2025-02-23

## 2025-02-28 PROBLEM — N94.10 DYSPAREUNIA IN FEMALE: Status: ACTIVE | Noted: 2025-02-28

## 2025-02-28 PROBLEM — F32.2 CURRENT SEVERE EPISODE OF MAJOR DEPRESSIVE DISORDER WITHOUT PSYCHOTIC FEATURES, UNSPECIFIED WHETHER RECURRENT (H): Status: ACTIVE | Noted: 2025-02-28

## 2025-03-03 ENCOUNTER — THERAPY VISIT (OUTPATIENT)
Dept: PHYSICAL THERAPY | Facility: CLINIC | Age: 30
End: 2025-03-03
Attending: EMERGENCY MEDICINE
Payer: COMMERCIAL

## 2025-03-03 DIAGNOSIS — M25.512 CHRONIC LEFT SHOULDER PAIN: Primary | ICD-10-CM

## 2025-03-03 DIAGNOSIS — G89.29 CHRONIC LEFT SHOULDER PAIN: Primary | ICD-10-CM

## 2025-03-03 PROCEDURE — 97140 MANUAL THERAPY 1/> REGIONS: CPT | Mod: GP | Performed by: PHYSICAL THERAPIST

## 2025-03-03 PROCEDURE — 97110 THERAPEUTIC EXERCISES: CPT | Mod: GP | Performed by: PHYSICAL THERAPIST

## 2025-03-10 ENCOUNTER — THERAPY VISIT (OUTPATIENT)
Dept: PHYSICAL THERAPY | Facility: CLINIC | Age: 30
End: 2025-03-10
Attending: EMERGENCY MEDICINE
Payer: COMMERCIAL

## 2025-03-10 DIAGNOSIS — M25.512 CHRONIC LEFT SHOULDER PAIN: Primary | ICD-10-CM

## 2025-03-10 DIAGNOSIS — G89.29 CHRONIC LEFT SHOULDER PAIN: Primary | ICD-10-CM

## 2025-03-10 PROCEDURE — 97110 THERAPEUTIC EXERCISES: CPT | Mod: GP | Performed by: PHYSICAL THERAPIST

## 2025-03-10 PROCEDURE — 97140 MANUAL THERAPY 1/> REGIONS: CPT | Mod: GP | Performed by: PHYSICAL THERAPIST

## 2025-03-11 ENCOUNTER — PATIENT OUTREACH (OUTPATIENT)
Dept: FAMILY MEDICINE | Facility: OTHER | Age: 30
End: 2025-03-11
Payer: COMMERCIAL

## 2025-03-11 PROBLEM — R87.810 CERVICAL HIGH RISK HPV (HUMAN PAPILLOMAVIRUS) TEST POSITIVE: Status: ACTIVE | Noted: 2023-07-07

## 2025-03-17 ENCOUNTER — THERAPY VISIT (OUTPATIENT)
Dept: PHYSICAL THERAPY | Facility: CLINIC | Age: 30
End: 2025-03-17
Attending: EMERGENCY MEDICINE
Payer: COMMERCIAL

## 2025-03-17 DIAGNOSIS — G89.29 CHRONIC LEFT SHOULDER PAIN: Primary | ICD-10-CM

## 2025-03-17 DIAGNOSIS — M25.512 CHRONIC LEFT SHOULDER PAIN: Primary | ICD-10-CM

## 2025-03-17 PROCEDURE — 97110 THERAPEUTIC EXERCISES: CPT | Mod: GP | Performed by: PHYSICAL THERAPIST

## 2025-03-18 ENCOUNTER — HOSPITAL ENCOUNTER (OUTPATIENT)
Dept: ULTRASOUND IMAGING | Facility: CLINIC | Age: 30
Discharge: HOME | End: 2025-03-18
Attending: OBSTETRICS & GYNECOLOGY | Admitting: OBSTETRICS & GYNECOLOGY
Payer: COMMERCIAL

## 2025-03-18 DIAGNOSIS — N94.10 DYSPAREUNIA IN FEMALE: ICD-10-CM

## 2025-03-18 PROCEDURE — 76856 US EXAM PELVIC COMPLETE: CPT

## 2025-03-31 ENCOUNTER — THERAPY VISIT (OUTPATIENT)
Dept: PHYSICAL THERAPY | Facility: CLINIC | Age: 30
End: 2025-03-31
Attending: EMERGENCY MEDICINE
Payer: COMMERCIAL

## 2025-03-31 DIAGNOSIS — M25.512 CHRONIC LEFT SHOULDER PAIN: Primary | ICD-10-CM

## 2025-03-31 DIAGNOSIS — G89.29 CHRONIC LEFT SHOULDER PAIN: Primary | ICD-10-CM

## 2025-03-31 PROCEDURE — 97110 THERAPEUTIC EXERCISES: CPT | Mod: GP | Performed by: PHYSICAL THERAPIST

## 2025-04-04 PROBLEM — Z87.42 HISTORY OF PCOS: Status: ACTIVE | Noted: 2025-04-04

## 2025-04-08 ENCOUNTER — HOSPITAL ENCOUNTER (OUTPATIENT)
Dept: ULTRASOUND IMAGING | Facility: CLINIC | Age: 30
Discharge: HOME | End: 2025-04-08
Payer: COMMERCIAL

## 2025-04-08 DIAGNOSIS — Z86.39 HX OF THYROID NODULE: ICD-10-CM

## 2025-04-08 DIAGNOSIS — E66.813 CLASS 3 SEVERE OBESITY WITH BODY MASS INDEX (BMI) OF 40.0 TO 44.9 IN ADULT, UNSPECIFIED OBESITY TYPE, UNSPECIFIED WHETHER SERIOUS COMORBIDITY PRESENT (H): ICD-10-CM

## 2025-04-08 DIAGNOSIS — E66.01 CLASS 3 SEVERE OBESITY WITH BODY MASS INDEX (BMI) OF 40.0 TO 44.9 IN ADULT, UNSPECIFIED OBESITY TYPE, UNSPECIFIED WHETHER SERIOUS COMORBIDITY PRESENT (H): ICD-10-CM

## 2025-04-08 PROCEDURE — 76536 US EXAM OF HEAD AND NECK: CPT

## 2025-05-01 ENCOUNTER — THERAPY VISIT (OUTPATIENT)
Dept: PHYSICAL THERAPY | Facility: CLINIC | Age: 30
End: 2025-05-01
Attending: EMERGENCY MEDICINE
Payer: COMMERCIAL

## 2025-05-01 DIAGNOSIS — G89.29 CHRONIC LEFT SHOULDER PAIN: Primary | ICD-10-CM

## 2025-05-01 DIAGNOSIS — M25.512 CHRONIC LEFT SHOULDER PAIN: Primary | ICD-10-CM

## 2025-05-01 PROCEDURE — 97110 THERAPEUTIC EXERCISES: CPT | Mod: GP | Performed by: PHYSICAL THERAPIST

## 2025-05-01 PROCEDURE — 97140 MANUAL THERAPY 1/> REGIONS: CPT | Mod: GP | Performed by: PHYSICAL THERAPIST

## 2025-05-14 ENCOUNTER — HOSPITAL ENCOUNTER (EMERGENCY)
Facility: CLINIC | Age: 30
Discharge: HOME | End: 2025-05-14
Attending: NURSE PRACTITIONER | Admitting: NURSE PRACTITIONER
Payer: COMMERCIAL

## 2025-05-14 VITALS
HEART RATE: 78 BPM | BODY MASS INDEX: 40.1 KG/M2 | TEMPERATURE: 98 F | SYSTOLIC BLOOD PRESSURE: 115 MMHG | DIASTOLIC BLOOD PRESSURE: 79 MMHG | RESPIRATION RATE: 14 BRPM | OXYGEN SATURATION: 98 % | WEIGHT: 232 LBS

## 2025-05-14 DIAGNOSIS — R07.9 CHEST PAIN, UNSPECIFIED TYPE: ICD-10-CM

## 2025-05-14 DIAGNOSIS — M62.838 MUSCLE SPASM OF LEFT SHOULDER: ICD-10-CM

## 2025-05-14 LAB
ALBUMIN SERPL BCG-MCNC: 4.4 G/DL (ref 3.5–5.2)
ALP SERPL-CCNC: 70 U/L (ref 40–150)
ALT SERPL W P-5'-P-CCNC: 30 U/L (ref 0–50)
ANION GAP SERPL CALCULATED.3IONS-SCNC: 10 MMOL/L (ref 7–15)
AST SERPL W P-5'-P-CCNC: 22 U/L (ref 0–45)
ATRIAL RATE - MUSE: 94 BPM
BASOPHILS # BLD AUTO: 0.1 10E3/UL (ref 0–0.2)
BASOPHILS NFR BLD AUTO: 1 %
BILIRUB SERPL-MCNC: 0.5 MG/DL
BUN SERPL-MCNC: 9.9 MG/DL (ref 6–20)
CALCIUM SERPL-MCNC: 9.8 MG/DL (ref 8.8–10.4)
CHLORIDE SERPL-SCNC: 104 MMOL/L (ref 98–107)
CREAT SERPL-MCNC: 0.84 MG/DL (ref 0.51–0.95)
DIASTOLIC BLOOD PRESSURE - MUSE: NORMAL MMHG
EGFRCR SERPLBLD CKD-EPI 2021: >90 ML/MIN/1.73M2
EOSINOPHIL # BLD AUTO: 0.4 10E3/UL (ref 0–0.7)
EOSINOPHIL NFR BLD AUTO: 3 %
ERYTHROCYTE [DISTWIDTH] IN BLOOD BY AUTOMATED COUNT: 12.5 % (ref 10–15)
GLUCOSE SERPL-MCNC: 109 MG/DL (ref 70–99)
HCO3 SERPL-SCNC: 27 MMOL/L (ref 22–29)
HCT VFR BLD AUTO: 46 % (ref 35–47)
HGB BLD-MCNC: 15.4 G/DL (ref 11.7–15.7)
IMM GRANULOCYTES # BLD: 0 10E3/UL
IMM GRANULOCYTES NFR BLD: 0 %
INTERPRETATION ECG - MUSE: NORMAL
LIPASE SERPL-CCNC: 21 U/L (ref 13–60)
LYMPHOCYTES # BLD AUTO: 2.7 10E3/UL (ref 0.8–5.3)
LYMPHOCYTES NFR BLD AUTO: 22 %
MCH RBC QN AUTO: 29.1 PG (ref 26.5–33)
MCHC RBC AUTO-ENTMCNC: 33.5 G/DL (ref 31.5–36.5)
MCV RBC AUTO: 87 FL (ref 78–100)
MONOCYTES # BLD AUTO: 0.9 10E3/UL (ref 0–1.3)
MONOCYTES NFR BLD AUTO: 8 %
NEUTROPHILS # BLD AUTO: 8 10E3/UL (ref 1.6–8.3)
NEUTROPHILS NFR BLD AUTO: 66 %
NRBC # BLD AUTO: 0 10E3/UL
NRBC BLD AUTO-RTO: 0 /100
P AXIS - MUSE: 60 DEGREES
PLATELET # BLD AUTO: 373 10E3/UL (ref 150–450)
POTASSIUM SERPL-SCNC: 4.3 MMOL/L (ref 3.4–5.3)
PR INTERVAL - MUSE: 156 MS
PROT SERPL-MCNC: 7.6 G/DL (ref 6.4–8.3)
QRS DURATION - MUSE: 66 MS
QT - MUSE: 358 MS
QTC - MUSE: 447 MS
R AXIS - MUSE: 43 DEGREES
RBC # BLD AUTO: 5.29 10E6/UL (ref 3.8–5.2)
SODIUM SERPL-SCNC: 141 MMOL/L (ref 135–145)
SYSTOLIC BLOOD PRESSURE - MUSE: NORMAL MMHG
T AXIS - MUSE: 38 DEGREES
TROPONIN T SERPL HS-MCNC: <6 NG/L
VENTRICULAR RATE- MUSE: 94 BPM
WBC # BLD AUTO: 12.2 10E3/UL (ref 4–11)

## 2025-05-14 PROCEDURE — 84484 ASSAY OF TROPONIN QUANT: CPT | Performed by: NURSE PRACTITIONER

## 2025-05-14 PROCEDURE — 99285 EMERGENCY DEPT VISIT HI MDM: CPT | Mod: 25 | Performed by: NURSE PRACTITIONER

## 2025-05-14 PROCEDURE — 83690 ASSAY OF LIPASE: CPT | Performed by: NURSE PRACTITIONER

## 2025-05-14 PROCEDURE — 99284 EMERGENCY DEPT VISIT MOD MDM: CPT | Performed by: NURSE PRACTITIONER

## 2025-05-14 PROCEDURE — 36415 COLL VENOUS BLD VENIPUNCTURE: CPT | Performed by: NURSE PRACTITIONER

## 2025-05-14 PROCEDURE — 85025 COMPLETE CBC W/AUTO DIFF WBC: CPT | Performed by: NURSE PRACTITIONER

## 2025-05-14 PROCEDURE — 82565 ASSAY OF CREATININE: CPT | Performed by: NURSE PRACTITIONER

## 2025-05-14 PROCEDURE — 93005 ELECTROCARDIOGRAM TRACING: CPT | Performed by: NURSE PRACTITIONER

## 2025-05-14 PROCEDURE — 93010 ELECTROCARDIOGRAM REPORT: CPT | Performed by: NURSE PRACTITIONER

## 2025-05-14 RX ORDER — CYCLOBENZAPRINE HCL 10 MG
10 TABLET ORAL 3 TIMES DAILY PRN
Qty: 15 TABLET | Refills: 0 | Status: SHIPPED | OUTPATIENT
Start: 2025-05-14

## 2025-05-14 ASSESSMENT — ACTIVITIES OF DAILY LIVING (ADL)
ADLS_ACUITY_SCORE: 41

## 2025-05-15 NOTE — ED PROVIDER NOTES
History     Chief Complaint   Patient presents with    Chest Pain     HPI  Rosario Gibson is a 29 year old female who presents for evaluation of chest pain and intermittent left bicep squeezing.  Symptoms started on Thursday, 6 days ago with squeezing sensation in her left upper bicep.  This lasted for couple minutes.  She has been having intermittent headaches all week.  This evening just prior to arrival she started to have the squeezing sensation in her left bicep again which lasted for 2 minutes.  This happened again a second time lasting only a couple minutes.  She describes chest heaviness and slight nausea.  Denies shortness of breath.  Denies cough or congestion.  Denies any recent illness.  Denies constipation or diarrhea.  She does have a history of chronic left shoulder pain for which she is having physical therapy.  Current everyday smoker.  Allergies:  No Known Allergies    Problem List:    Patient Active Problem List    Diagnosis Date Noted    History of PCOS 04/04/2025     Priority: Medium    Dyspareunia in female 02/28/2025     Priority: Medium    Current severe episode of major depressive disorder without psychotic features, unspecified whether recurrent (H) 02/28/2025     Priority: Medium    Chronic left shoulder pain 02/21/2025     Priority: Medium    Cervical high risk HPV (human papillomavirus) test positive 07/07/2023     Priority: Medium     6/18/20 ASCUS pap, + HR HPV # 18  7/8/20 [place] bx: no [place].   4/18/22 ASCUS pap, neg HR HPV  7/7/23 NIL pap, + HR HPV (not 16 or 18). Plan: cotest in 1 yr  2/28/25 NIL Pap, Neg HR HPV. Plan: cotest in 1 yr.       Obesity, morbid (more than 100 lbs over ideal weight or BMI > 40) (H) 06/26/2013     Priority: Medium    Tobacco abuse 06/26/2013     Priority: Medium    Acne 03/13/2013     Priority: Medium        Past Medical History:    Past Medical History:   Diagnosis Date    Acne     Allergic rhinitis     Obesity        Past Surgical History:     Past Surgical History:   Procedure Laterality Date    NO HISTORY OF SURGERY         Family History:    Family History   Problem Relation Age of Onset    Hypertension Maternal Grandmother     Diabetes Paternal Grandmother     Endometriosis Sister     Breast Cancer Other        Social History:  Marital Status:  Single [1]  Social History     Tobacco Use    Smoking status: Every Day     Current packs/day: 0.40     Average packs/day: 0.4 packs/day for 19.0 years (8.3 ttl pk-yrs)     Types: Cigarettes     Start date: 12/31/2013     Last attempt to quit: 9/1/2012     Passive exposure: Past    Smokeless tobacco: Never   Vaping Use    Vaping status: Never Used   Substance Use Topics    Alcohol use: No    Drug use: No        Medications:    cyclobenzaprine (FLEXERIL) 10 MG tablet  calcium carbonate antacid (TUMS ULTRA 1000) 1000 MG CHEW  etonogestrel-ethinyl estradiol (NUVARING) 0.12-0.015 MG/24HR vaginal ring  hydrOXYzine HCl (ATARAX) 25 MG tablet  hydrOXYzine edd (VISTARIL) 25 MG capsule  metFORMIN (GLUCOPHAGE XR) 500 MG 24 hr tablet          Review of Systems  As mentioned above in the history present illness. All other systems were reviewed and are negative.    Physical Exam   BP: (!) 139/94  Pulse: 92  Temp: 98  F (36.7  C)  Resp: 18  Weight: 105.2 kg (232 lb)  SpO2: 100 %      Physical Exam  Constitutional:       General: She is not in acute distress.     Appearance: Normal appearance. She is well-developed. She is not ill-appearing.   HENT:      Head: Normocephalic and atraumatic.      Right Ear: External ear normal.      Left Ear: External ear normal.      Nose: Nose normal.      Mouth/Throat:      Mouth: Mucous membranes are moist.   Eyes:      Conjunctiva/sclera: Conjunctivae normal.   Cardiovascular:      Rate and Rhythm: Normal rate and regular rhythm.      Heart sounds: Normal heart sounds. No murmur heard.  Pulmonary:      Effort: Pulmonary effort is normal. No respiratory distress.      Breath sounds: Normal  breath sounds.   Chest:      Chest wall: Tenderness present. No crepitus.       Abdominal:      General: Bowel sounds are normal. There is no distension.      Palpations: Abdomen is soft.      Tenderness: There is no abdominal tenderness.   Musculoskeletal:         General: Normal range of motion.   Skin:     General: Skin is warm and dry.      Findings: No rash.   Neurological:      General: No focal deficit present.      Mental Status: She is alert and oriented to person, place, and time.         ED Course        Procedures              EKG Interpretation:      Interpreted by STORMY Vickers CNP  Time reviewed:2033   Symptoms at time of EKG: None   Rhythm: Normal sinus   Rate: Normal  Axis: Normal  Ectopy: None  Conduction: Normal  ST Segments/ T Waves: No ST-T wave changes and No acute ischemic changes  Q Waves: None  Comparison to prior: Unchanged    Clinical Impression: NSR with no acute ischemic changes         Results for orders placed or performed during the hospital encounter of 05/14/25 (from the past 24 hours)   EKG 12-lead, tracing only   Result Value Ref Range    Systolic Blood Pressure  mmHg    Diastolic Blood Pressure  mmHg    Ventricular Rate 94 BPM    Atrial Rate 94 BPM    OK Interval 156 ms    QRS Duration 66 ms     ms    QTc 447 ms    P Axis 60 degrees    R AXIS 43 degrees    T Axis 38 degrees    Interpretation ECG       Sinus rhythm  Normal ECG  No previous ECGs available  Confirmed by SEE ED PROVIDER NOTE FOR, ECG INTERPRETATION ([id number]),  Shailesh Herrera ([id number]) on 5/14/2025 11:33:59 PM     CBC with platelets differential    Narrative    The following orders were created for panel order CBC with platelets differential.  Procedure                               Abnormality         Status                     ---------                               -----------         ------                     CBC with platelets and ...[[id number]]  Abnormal            Final result                  Please view results for these tests on the individual orders.   Comprehensive metabolic panel   Result Value Ref Range    Sodium 141 135 - 145 mmol/L    Potassium 4.3 3.4 - 5.3 mmol/L    Carbon Dioxide (CO2) 27 22 - 29 mmol/L    Anion Gap 10 7 - 15 mmol/L    Urea Nitrogen 9.9 6.0 - 20.0 mg/dL    Creatinine 0.84 0.51 - 0.95 mg/dL    GFR Estimate >90 >60 mL/min/1.73m2    Calcium 9.8 8.8 - 10.4 mg/dL    Chloride 104 98 - 107 mmol/L    Glucose 109 (H) 70 - 99 mg/dL    Alkaline Phosphatase 70 40 - 150 U/L    AST 22 0 - 45 U/L    ALT 30 0 - 50 U/L    Protein Total 7.6 6.4 - 8.3 g/dL    Albumin 4.4 3.5 - 5.2 g/dL    Bilirubin Total 0.5 <=1.2 mg/dL   Troponin T, High Sensitivity   Result Value Ref Range    Troponin T, High Sensitivity <6 <=14 ng/L   Lipase   Result Value Ref Range    Lipase 21 13 - 60 U/L   CBC with platelets and differential   Result Value Ref Range    WBC Count 12.2 (H) 4.0 - 11.0 10e3/uL    RBC Count 5.29 (H) 3.80 - 5.20 10e6/uL    Hemoglobin 15.4 11.7 - 15.7 g/dL    Hematocrit 46.0 35.0 - 47.0 %    MCV 87 78 - 100 fL    MCH 29.1 26.5 - 33.0 pg    MCHC 33.5 31.5 - 36.5 g/dL    RDW 12.5 10.0 - 15.0 %    Platelet Count 373 150 - 450 10e3/uL    % Neutrophils 66 %    % Lymphocytes 22 %    % Monocytes 8 %    % Eosinophils 3 %    % Basophils 1 %    % Immature Granulocytes 0 %    NRBCs per 100 WBC 0 <1 /100    Absolute Neutrophils 8.0 1.6 - 8.3 10e3/uL    Absolute Lymphocytes 2.7 0.8 - 5.3 10e3/uL    Absolute Monocytes 0.9 0.0 - 1.3 10e3/uL    Absolute Eosinophils 0.4 0.0 - 0.7 10e3/uL    Absolute Basophils 0.1 0.0 - 0.2 10e3/uL    Absolute Immature Granulocytes 0.0 <=0.4 10e3/uL    Absolute NRBCs 0.0 10e3/uL   XR Chest 2 Views    Narrative    EXAM: XR CHEST 2 VIEWS  LOCATION: [facility]  DATE: 5/14/2025    INDICATION: chest pain  COMPARISON: None.      Impression    IMPRESSION:  1.  Clear lungs.  2.  No pleural effusion or pneumothorax.  3.  Normal heart size.           Medications - No data to display    Assessments & Plan (with Medical Decision Making)     Exam without evidence of volume overload so doubt heart failure.   EKG without signs of active ischemia.   Given the timing of pain to ER presentation, a single high sensitivity troponin was normal so doubt NSTEMI.   Presentation not consistent with acute PE.  CXR negative for pneumothorax. No pneumonia.   I have low suspicion for myocarditis (no recent illness, neg trop).   HEART score: 0.  So no need for admission for risk stratification.     I suspect her left bicep spasms is related to her chronic left shoulder pain and may be due to muscle fatigue from PT.     Plan:  No worrisome findings on your labs, chest x-ray, or EKG.  Flexeril 10 mg 3 times a day as needed for muscle spasm.    Make an appointment for recheck with your clinic provider.    Return to the emergency department for any worsening symptoms.        HEART Score  Background  Calculates the overall risk of adverse event in patient's presenting with chest pain.  Based on 5 criteria (each assigned 0-2 points) including suspiciousness of history, EKG, age, risk factors and troponin.    Data  29 year old female  has Acne; Obesity, morbid (more than 100 lbs over ideal weight or BMI > 40) (H); Tobacco abuse; Chronic left shoulder pain; Dyspareunia in female; Current severe episode of major depressive disorder without psychotic features, unspecified whether recurrent (H); Cervical high risk HPV (human papillomavirus) test positive; and History of PCOS on their problem list.   reports that she has been smoking cigarettes. She started smoking about 11 years ago. She has a 8.3 pack-year smoking history. She has been exposed to tobacco smoke. She has never used smokeless tobacco.  family history includes Breast Cancer in an other family member; Diabetes in her paternal grandmother; Endometriosis in her sister; Hypertension in her maternal grandmother.  No results  "found for: \"TROPI\", \"TROPONIN\", \"TROPR\", \"TROPN\"  Criteria   0-2 points for each of 5 items (maximum of 10 points):  Score 0- History slightly suspicious for coronary syndrome  Score 0- EKG Normal  Score 0- Age <45 years old  Score 0- No risk factors for atherosclerotic disease  Score 0- Within normal limits for troponin levels  Interpretation  Risk of adverse outcome  Heart Score: 0  Total Score 0-3- Adverse Outcome Risk 2.5% - Supports early discharge with appropriate follow-up      Discharge Medication List as of 5/14/2025 11:21 PM        START taking these medications    Details   cyclobenzaprine (FLEXERIL) 10 MG tablet Take 1 tablet (10 mg) by mouth 3 times daily as needed for muscle spasms., Disp-15 tablet, R-0, [facility]             Final diagnoses:   Chest pain, unspecified type   Muscle spasm of left shoulder       5/14/2025   [facility] EMERGENCY DEPT       Anna Connell APRN CNP  05/15/25 0108    "

## 2025-05-15 NOTE — ED TRIAGE NOTES
Pt presents with concern of chest heaviness and squeezing in left bicep. Pt has been dealing with shoulder issues for a while, seeing PT. Pt has had headaches over past few days

## 2025-05-19 ENCOUNTER — THERAPY VISIT (OUTPATIENT)
Dept: PHYSICAL THERAPY | Facility: CLINIC | Age: 30
End: 2025-05-19
Attending: EMERGENCY MEDICINE
Payer: COMMERCIAL

## 2025-05-19 DIAGNOSIS — G89.29 CHRONIC LEFT SHOULDER PAIN: Primary | ICD-10-CM

## 2025-05-19 DIAGNOSIS — M25.512 CHRONIC LEFT SHOULDER PAIN: Primary | ICD-10-CM

## 2025-05-19 PROCEDURE — 97140 MANUAL THERAPY 1/> REGIONS: CPT | Mod: GP | Performed by: PHYSICAL THERAPIST

## 2025-05-19 PROCEDURE — 97110 THERAPEUTIC EXERCISES: CPT | Mod: GP | Performed by: PHYSICAL THERAPIST

## 2025-06-23 ENCOUNTER — OFFICE VISIT (OUTPATIENT)
Dept: FAMILY MEDICINE | Facility: CLINIC | Age: 30
End: 2025-06-23
Payer: COMMERCIAL

## 2025-06-23 ENCOUNTER — NURSE TRIAGE (OUTPATIENT)
Dept: FAMILY MEDICINE | Facility: OTHER | Age: 30
End: 2025-06-23

## 2025-06-23 VITALS
WEIGHT: 224 LBS | BODY MASS INDEX: 38.24 KG/M2 | SYSTOLIC BLOOD PRESSURE: 110 MMHG | DIASTOLIC BLOOD PRESSURE: 80 MMHG | HEART RATE: 90 BPM | TEMPERATURE: 98.8 F | HEIGHT: 64 IN | RESPIRATION RATE: 16 BRPM | OXYGEN SATURATION: 98 %

## 2025-06-23 DIAGNOSIS — R91.8 PULMONARY NODULES: ICD-10-CM

## 2025-06-23 DIAGNOSIS — M25.512 CHRONIC LEFT SHOULDER PAIN: ICD-10-CM

## 2025-06-23 DIAGNOSIS — R59.9 ENLARGED LYMPH NODES: ICD-10-CM

## 2025-06-23 DIAGNOSIS — G89.29 CHRONIC LEFT SHOULDER PAIN: ICD-10-CM

## 2025-06-23 DIAGNOSIS — E66.09 CLASS 2 OBESITY DUE TO EXCESS CALORIES WITHOUT SERIOUS COMORBIDITY WITH BODY MASS INDEX (BMI) OF 38.0 TO 38.9 IN ADULT: ICD-10-CM

## 2025-06-23 DIAGNOSIS — M54.2 CHRONIC NECK PAIN: Primary | ICD-10-CM

## 2025-06-23 DIAGNOSIS — E66.812 CLASS 2 OBESITY DUE TO EXCESS CALORIES WITHOUT SERIOUS COMORBIDITY WITH BODY MASS INDEX (BMI) OF 38.0 TO 38.9 IN ADULT: ICD-10-CM

## 2025-06-23 DIAGNOSIS — G89.29 CHRONIC NECK PAIN: Primary | ICD-10-CM

## 2025-06-23 PROCEDURE — 99214 OFFICE O/P EST MOD 30 MIN: CPT | Mod: 25 | Performed by: NURSE PRACTITIONER

## 2025-06-23 PROCEDURE — 3079F DIAST BP 80-89 MM HG: CPT | Performed by: NURSE PRACTITIONER

## 2025-06-23 PROCEDURE — 3074F SYST BP LT 130 MM HG: CPT | Performed by: NURSE PRACTITIONER

## 2025-06-23 PROCEDURE — G2211 COMPLEX E/M VISIT ADD ON: HCPCS | Performed by: NURSE PRACTITIONER

## 2025-06-23 PROCEDURE — 96372 THER/PROPH/DIAG INJ SC/IM: CPT | Performed by: NURSE PRACTITIONER

## 2025-06-23 PROCEDURE — 1125F AMNT PAIN NOTED PAIN PRSNT: CPT | Performed by: NURSE PRACTITIONER

## 2025-06-23 RX ORDER — KETOROLAC TROMETHAMINE 30 MG/ML
60 INJECTION, SOLUTION INTRAMUSCULAR; INTRAVENOUS ONCE
Status: COMPLETED | OUTPATIENT
Start: 2025-06-23 | End: 2025-06-23

## 2025-06-23 RX ADMIN — KETOROLAC TROMETHAMINE 60 MG: 30 INJECTION, SOLUTION INTRAMUSCULAR; INTRAVENOUS at 15:39

## 2025-06-23 ASSESSMENT — PAIN SCALES - GENERAL: PAINLEVEL_OUTOF10: SEVERE PAIN (8)

## 2025-06-23 NOTE — PROGRESS NOTES
"  Assessment & Plan     Chronic neck pain  Chronic left shoulder pain  Acute on chronic neck and shoulder pain, worsening since completion of physical therapy although has been waxing and waning during that time. She has not been using any NSAIDs for her pain which would likely be beneficial for her in regards to pain and inflammation. She is agreeable to Toradol injection today, discussed OTC Naproxen or Ibuprofen for symptoms as well. Topical analgesia could be helpful for her as well. Encouraged to continue home exercises from physical therapy. Discussed given ongoing chronic symptoms, further evaluation of the cervical spine and shoulder with MRI. She is agreeable to this and orders placed.   - MR Cervical Spine w/o Contrast; Future  - MR Shoulder Left w/o Contrast; Future  - ketorolac (TORADOL) injection 60 mg    Pulmonary nodules  Enlarged lymph nodes  Reviewed CT from October, 2024 which did show evidence of pulmonary nodules and enlarged hilar and mediastinal lymph nodes. Recommendation was made for follow-up between 6-12 months. Orders for repeat imaging placed. Follow-up pending results.   - CT Chest w/o & w Contrast; Future    Obesity- BMI  Estimated body mass index is 38.45 kg/m  as calculated from the following:    Height as of this encounter: 1.626 m (5' 4\").    Weight as of this encounter: 101.6 kg (224 lb).   Weight management plan: Discussed healthy diet and exercise guidelines    The longitudinal plan of care for the diagnosis(es)/condition(s) as documented were addressed during this visit. Due to the added complexity in care, I will continue to support Rosario in the subsequent management and with ongoing continuity of care.      Subjective   Rosario is a 30 year old, presenting for the following health issues:  Neck Pain and Shoulder Pain      6/23/2025     3:03 PM   Additional Questions   Roomed by Miri BUNDY   Accompanied by boyfriend and son     Shoulder Pain    History of Present Illness " "      Reason for visit:  Shoulder/neck pain    She eats 2-3 servings of fruits and vegetables daily.She consumes 0 sweetened beverage(s) daily.She exercises with enough effort to increase her heart rate 10 to 19 minutes per day.  She exercises with enough effort to increase her heart rate 3 or less days per week.   She is taking medications regularly.      Rosario presents today with concerns of chronic neck and shoulder pain. She has had chronic pain for the past two years, worsening now over the past several days. Two years ago she fell about a week after having her son. She reports hitting her back around the level of the shoulders as well as her neck on stairs. She recently has been participating in physical therapy from earlier this year, completing this earlier this month. Over the past two weeks, and specifically over the past few days, She notes her neck has been more stiff which has caused her shoulder to be more tight and sore. The tightness will radiate around to her chest as well, left more than right. She denies any new injury or trauma. She does note some numbness and tingling when she first wakes in the morning depending on her sleeping position. She notes intermittent headaches as well as vertigo symptoms which cause her to feel dizzy and unbalanced. She has had some nausea, no vomiting. Her symptoms are best when she is laying down, also tilting her head to the left improves her symptoms as well. She has been taking her hydroxyzine as needed as her symptoms worsen her anxiety. She has not used her cyclobenzaprine, but has tried this in the past with some improvement. She states she is overall \"not good\" at taking pills so she has not routinely been taking any OTC pain relief for her symptoms.     Patient Active Problem List   Diagnosis    Acne    Obesity, morbid (more than 100 lbs over ideal weight or BMI > 40) (H)    Tobacco abuse    Chronic left shoulder pain    Dyspareunia in female    Current " "severe episode of major depressive disorder without psychotic features, unspecified whether recurrent (H)    Cervical high risk HPV (human papillomavirus) test positive    History of PCOS     Current Outpatient Medications   Medication Sig Dispense Refill    calcium carbonate antacid (TUMS ULTRA 1000) 1000 MG CHEW Take 3,000 mg by mouth once as needed (heartburn).      cyclobenzaprine (FLEXERIL) 10 MG tablet Take 1 tablet (10 mg) by mouth 3 times daily as needed for muscle spasms. 15 tablet 0    hydrOXYzine HCl (ATARAX) 25 MG tablet Take 1-2 tablets (25-50 mg) by mouth 2 times daily as needed for anxiety. 60 tablet 0     No current facility-administered medications for this visit.       Review of Systems  Constitutional, HEENT, cardiovascular, pulmonary, gi and gu systems are negative, except as otherwise noted.      Objective    /80   Pulse 90   Temp 98.8  F (37.1  C) (Temporal)   Resp 16   Ht 1.626 m (5' 4\")   Wt 101.6 kg (224 lb)   LMP 06/09/2025 (Approximate)   SpO2 98%   BMI 38.45 kg/m    Body mass index is 38.45 kg/m .  Physical Exam  Vitals reviewed.   Constitutional:       General: She is not in acute distress.     Appearance: Normal appearance.   Eyes:      Extraocular Movements: Extraocular movements intact.      Conjunctiva/sclera: Conjunctivae normal.   Cardiovascular:      Rate and Rhythm: Normal rate and regular rhythm.      Heart sounds: Normal heart sounds.   Pulmonary:      Effort: Pulmonary effort is normal.      Breath sounds: Normal breath sounds.   Musculoskeletal:      Cervical back: Spasms and tenderness present. No edema, erythema or bony tenderness. Pain with movement present. Decreased range of motion.   Skin:     General: Skin is warm and dry.   Neurological:      Mental Status: She is alert and oriented to person, place, and time. Mental status is at baseline.   Psychiatric:         Mood and Affect: Mood normal.         Behavior: Behavior normal.                    Signed " Electronically by: Sharri Bennett NP

## 2025-06-23 NOTE — TELEPHONE ENCOUNTER
"Please triage for appointment w/ES this afternoon: \"Shoulder/spine pain, neck pain\" per ES, she is not sure she can see this via video visit.   Future Appointments 6/23/2025 - 12/20/2025        Date Visit Type Length Department Provider     6/23/2025 11:20 AM [facility] OFFICE VISIT  20 min ER FAMILY [facility] Lindsay Rios PA-C    Location Instructions:     [facility] is located at [address] [place] [address], between [address] and [address]. Free lot parking is available. Upon entering the building, the clinic is to the left.                    Brenna Murphy MA    "

## 2025-06-23 NOTE — TELEPHONE ENCOUNTER
RN Triage    Patient Contact    Attempt # 1    Was call answered?  No.  Left message on voicemail with information to call me back and sent MCM.    No in person appointments left in ER today. [facility] and [facility] have in person appointments today.    Beatriz Noel RN on 6/23/2025 at 8:56 AM

## 2025-06-23 NOTE — TELEPHONE ENCOUNTER
Nurse Triage SBAR    Is this a 2nd Level Triage? No,  appointment within time frame.     Situation: patient calling in with complaints of chronic neck/shoulder pain that has been an issue for about 2 years. Last few days neck has been more stiff which is causing her shoulder to be more tight and sore.  Patient denies any new injury. States does have n/t down both arms at times, mainly happens when she wakes up, depending on how she slept.  Denies any n/t today. Denies any chest pain or shortness of breath. Denies fever or headache.     Background: chronic neck and shoulder pain flaring up.     Assessment: advised to be seen in clinic today.     Protocol Recommended Disposition:   See in Office Today    Recommendation: patient verbalized understanding and is agreeable. She is scheduled with provider today per request.  Will call clinic back if any symptoms worsen.      Appointment within timeframe.    Does the patient meet one of the following criteria for ADS visit consideration? 16+ years old, with an [facility] PCP     TIP  Providers, please consider if this condition is appropriate for management at one of our Acute and Diagnostic Services sites.     If patient is a good candidate, please use dotphrase <dot>triageresponse and select Refer to ADS to document.      Reason for Disposition   Patient wants to be seen    Additional Information   Negative: High-risk adult (e.g., history of cancer, HIV, or IV Drug Use)   Negative: Painful rash with multiple small blisters grouped together (i.e., dermatomal distribution or 'band' or 'stripe')   Negative: SEVERE pain (e.g., excruciating, unable to do any normal activities)   Negative: Head is twisting to one side (or ask 'is it turning against your will?')   Negative: Fever > 103 F (39.4 C)   Negative: Fever > 100 F (37.8 C) and Intravenous Drug Use (IVDU)   Negative: Fever > 100 F (37.8 C) and has diabetes mellitus or a weak immune system (e.g., HIV positive, cancer  chemotherapy, organ transplant, splenectomy, chronic steroids)   Negative: Numbness in an arm or hand (i.e., loss of sensation)   Negative: Difficulty breathing or unusual sweating (e.g., sweating without exertion)   Negative: Chest pain lasting longer than 5 minutes   Negative: Stiff neck (can't touch chin to chest) and has headache   Negative: Stiff neck (can't touch chin to chest) and fever   Negative: Weakness of an arm or hand   Negative: Problems with bowel or bladder control   Negative: Patient sounds very sick or weak to the triager   Negative: Followed an injury to neck (e.g., MVA, sports, impact or collision)   Negative: Chest pain   Negative: Lymph node in the neck is swollen or painful to the touch   Negative: Sore throat is main symptom   Negative: Shock suspected (e.g., cold/pale/clammy skin, too weak to stand, low BP, rapid pulse)   Negative: Similar pain previously and it was from 'heart attack'   Negative: Similar pain previously from 'angina' and not relieved by nitroglycerin   Negative: Difficult to awaken or acting confused (e.g., disoriented, slurred speech)   Negative: Sounds like a life-threatening emergency to the triager    Protocols used: Neck Pain or [id number]

## 2025-07-02 ENCOUNTER — HOSPITAL ENCOUNTER (OUTPATIENT)
Dept: MRI IMAGING | Facility: CLINIC | Age: 30
Discharge: HOME | End: 2025-07-02
Attending: NURSE PRACTITIONER
Payer: COMMERCIAL

## 2025-07-02 ENCOUNTER — HOSPITAL ENCOUNTER (OUTPATIENT)
Dept: CT IMAGING | Facility: CLINIC | Age: 30
Discharge: HOME | End: 2025-07-02
Attending: NURSE PRACTITIONER
Payer: COMMERCIAL

## 2025-07-02 ENCOUNTER — RESULTS FOLLOW-UP (OUTPATIENT)
Dept: FAMILY MEDICINE | Facility: CLINIC | Age: 30
End: 2025-07-02

## 2025-07-02 DIAGNOSIS — R91.8 PULMONARY NODULES: ICD-10-CM

## 2025-07-02 DIAGNOSIS — M25.512 CHRONIC LEFT SHOULDER PAIN: Primary | ICD-10-CM

## 2025-07-02 DIAGNOSIS — G89.29 CHRONIC LEFT SHOULDER PAIN: ICD-10-CM

## 2025-07-02 DIAGNOSIS — R59.9 ENLARGED LYMPH NODES: ICD-10-CM

## 2025-07-02 DIAGNOSIS — M25.512 CHRONIC LEFT SHOULDER PAIN: ICD-10-CM

## 2025-07-02 DIAGNOSIS — G89.29 CHRONIC LEFT SHOULDER PAIN: Primary | ICD-10-CM

## 2025-07-02 DIAGNOSIS — M54.2 CHRONIC NECK PAIN: ICD-10-CM

## 2025-07-02 DIAGNOSIS — G89.29 CHRONIC NECK PAIN: ICD-10-CM

## 2025-07-02 PROCEDURE — 73221 MRI JOINT UPR EXTREM W/O DYE: CPT | Mod: 26 | Performed by: RADIOLOGY

## 2025-07-02 PROCEDURE — 73221 MRI JOINT UPR EXTREM W/O DYE: CPT | Mod: LT

## 2025-07-02 PROCEDURE — 71260 CT THORAX DX C+: CPT

## 2025-07-02 PROCEDURE — 72141 MRI NECK SPINE W/O DYE: CPT

## 2025-07-02 PROCEDURE — 250N000009 HC RX 250: Performed by: NURSE PRACTITIONER

## 2025-07-02 PROCEDURE — 250N000011 HC RX IP 250 OP 636: Performed by: NURSE PRACTITIONER

## 2025-07-02 RX ORDER — IOPAMIDOL 755 MG/ML
500 INJECTION, SOLUTION INTRAVASCULAR ONCE
Status: COMPLETED | OUTPATIENT
Start: 2025-07-02 | End: 2025-07-02

## 2025-07-02 RX ADMIN — SODIUM CHLORIDE 60 ML: 9 INJECTION, SOLUTION INTRAVENOUS at 10:05

## 2025-07-02 RX ADMIN — IOPAMIDOL 81 ML: 755 INJECTION, SOLUTION INTRAVENOUS at 10:05

## 2025-07-03 ENCOUNTER — PATIENT OUTREACH (OUTPATIENT)
Dept: CARE COORDINATION | Facility: CLINIC | Age: 30
End: 2025-07-03
Payer: COMMERCIAL

## 2025-07-07 NOTE — CONFIDENTIAL NOTE
NEUROSURGERY - NEW PREVISIT PLANNING    Referring Provider: Sharri Bennett NP    OVN 6/23/25   Reason For Visit: M54.2, G89.29 (ICD-10-CM) - Chronic neck pain        IMAGING STATUS/LOCATION DATE/TYPE   MRI PACS 07/02/2025  Cervical  [facility]   CT N/A    XRAY N/A    NOTES STATUS/LOCATION DATE/TYPE   Other specialist OVN: N/A    EMG N/A    INJECTION N/A    PHYSICAL THERAPY Encounters 2025 (shoulder)   SURGERY N/A      Does patient have C2C?  Year last updated Action     YES   [x]   2024   Please update at appointment if outdated more than 5 years       NO     []   N/A   Please complete C2C at appointment

## 2025-07-10 ENCOUNTER — PRE VISIT (OUTPATIENT)
Dept: NEUROSURGERY | Facility: CLINIC | Age: 30
End: 2025-07-10

## 2025-07-10 ENCOUNTER — OFFICE VISIT (OUTPATIENT)
Dept: NEUROSURGERY | Facility: CLINIC | Age: 30
End: 2025-07-10
Attending: NURSE PRACTITIONER
Payer: COMMERCIAL

## 2025-07-10 VITALS
TEMPERATURE: 98.5 F | WEIGHT: 230 LBS | DIASTOLIC BLOOD PRESSURE: 84 MMHG | HEIGHT: 64 IN | BODY MASS INDEX: 39.27 KG/M2 | SYSTOLIC BLOOD PRESSURE: 116 MMHG

## 2025-07-10 DIAGNOSIS — M25.512 CHRONIC LEFT SHOULDER PAIN: Primary | ICD-10-CM

## 2025-07-10 DIAGNOSIS — G89.29 CHRONIC NECK PAIN: ICD-10-CM

## 2025-07-10 DIAGNOSIS — G89.29 CHRONIC LEFT SHOULDER PAIN: Primary | ICD-10-CM

## 2025-07-10 DIAGNOSIS — M79.602 LEFT ARM PAIN: ICD-10-CM

## 2025-07-10 DIAGNOSIS — M54.2 CHRONIC NECK PAIN: ICD-10-CM

## 2025-07-10 RX ORDER — COVID-19 ANTIGEN TEST
220 KIT MISCELLANEOUS 2 TIMES DAILY WITH MEALS
COMMUNITY

## 2025-07-10 ASSESSMENT — PAIN SCALES - GENERAL: PAINLEVEL_OUTOF10: SEVERE PAIN (7)

## 2025-07-10 NOTE — PATIENT INSTRUCTIONS
-Referral to physical therapy for neck concerns.   -Referral to orthopedics for evaluation of thoracic outlet syndrome. They will contact you to schedule.   -Please contact our clinic with questions or concerns at [phone].

## 2025-07-10 NOTE — PROGRESS NOTES
"Rosario Gibson is a 30 year old female who presents for:  Chief Complaint   Patient presents with    Neurologic Problem        Initial Vitals:  /84 (BP Location: Right arm, Patient Position: Sitting, Cuff Size: Adult Regular)   Temp 98.5  F (36.9  C) (Temporal)   Ht 5' 4\" (1.626 m)   Wt 230 lb (104.3 kg)   LMP 06/09/2025 (Approximate)   BMI 39.48 kg/m   Estimated body mass index is 39.48 kg/m  as calculated from the following:    Height as of this encounter: 5' 4\" (1.626 m).    Weight as of this encounter: 230 lb (104.3 kg).. Body surface area is 2.17 meters squared. BP completed using cuff size: regular  Severe Pain (7)    Nursing Comments:    Fatimah Ramirez LPN    "

## 2025-07-10 NOTE — PROGRESS NOTES
[facility]  Neurosurgery Clinic Visit      CC: neck, shoulder, and arm pain    Primary care Provider: Liya Garcia    Reason For Visit:   I was asked by Sharri Bennett NP to consult on the patient for chronic neck pain.    HPI: Rosario Gibson is a 30 year old female with a 2 year history of neck pain who presents for evaluation. She describes right-sided neck pain as well as left shoulder and clavicle pain to the left arm and into the left 4th and 5th digits. Reports associated paresthesias. Symptoms worse in the morning after her arms have been elevated over her head. No overt weakness. Has undergone PT for her left shoulder without any improvement.     Past Medical History:   Diagnosis Date    Acne     Allergic rhinitis     Obesity        Past Medical History reviewed with patient during visit.    Past Surgical History:   Procedure Laterality Date    NO HISTORY OF SURGERY       Past Surgical History reviewed with patient during visit.    Current Outpatient Medications   Medication Sig Dispense Refill    calcium carbonate antacid (TUMS ULTRA 1000) 1000 MG CHEW Take 3,000 mg by mouth once as needed (heartburn).      hydrOXYzine HCl (ATARAX) 25 MG tablet Take 1-2 tablets (25-50 mg) by mouth 2 times daily as needed for anxiety. 60 tablet 0    naproxen sodium 220 MG capsule Take 220 mg by mouth 2 times daily (with meals).      cyclobenzaprine (FLEXERIL) 10 MG tablet Take 1 tablet (10 mg) by mouth 3 times daily as needed for muscle spasms. (Patient not taking: Reported on 7/10/2025) 15 tablet 0     No current facility-administered medications for this visit.       No Known Allergies    Social History     Socioeconomic History    Marital status: Single     Spouse name: None    Number of children: None    Years of education: None    Highest education level: None   Occupational History     Employer: STUDENT     Employer: OTHER     Comment: [employer]   Tobacco Use    Smoking status: Former      Current packs/day: 0.00     Average packs/day: 0.4 packs/day for 19.0 years (8.4 ttl pk-yrs)     Types: Cigarettes     Start date: 2013     Quit date: 6/10/2025     Years since quittin.0     Passive exposure: Past    Smokeless tobacco: Never   Vaping Use    Vaping status: Never Used   Substance and Sexual Activity    Alcohol use: No    Drug use: No    Sexual activity: Yes     Partners: Male     Birth control/protection: Pull-out method, Condom, Natural Family Planning   Other Topics Concern    Parent/sibling w/ CABG, MI or angioplasty before 65F 55M? No     Social Drivers of Health     Financial Resource Strain: Low Risk  (3/30/2025)    Financial Resource Strain     Within the past 12 months, have you or your family members you live with been unable to get utilities (heat, electricity) when it was really needed?: No   Food Insecurity: Low Risk  (3/30/2025)    Food Insecurity     Within the past 12 months, did you worry that your food would run out before you got money to buy more?: No     Within the past 12 months, did the food you bought just not last and you didn t have money to get more?: No   Transportation Needs: Low Risk  (3/30/2025)    Transportation Needs     Within the past 12 months, has lack of transportation kept you from medical appointments, getting your medicines, non-medical meetings or appointments, work, or from getting things that you need?: No   Physical Activity: Insufficiently Active (3/30/2025)    Exercise Vital Sign     Days of Exercise per Week: 2 days     Minutes of Exercise per Session: 20 min   Stress: No Stress Concern Present (3/30/2025)    [place] of Occupational Health - Occupational Stress Questionnaire     Feeling of Stress : Only a little   Social Connections: Unknown (3/30/2025)    Social Connection and Isolation Panel [NHANES]     Frequency of Social Gatherings with Friends and Family: Once a week   Interpersonal Safety: Low Risk  (2024)    Interpersonal  "Safety     Do you feel physically and emotionally safe where you currently live?: Yes     Within the past 12 months, have you been hit, slapped, kicked or otherwise physically hurt by someone?: No     Within the past 12 months, have you been humiliated or emotionally abused in other ways by your partner or ex-partner?: No   Housing Stability: Low Risk  (3/30/2025)    Housing Stability     Do you have housing? : Yes     Are you worried about losing your housing?: No       Family History   Problem Relation Age of Onset    Hypertension Maternal Grandmother     Diabetes Paternal Grandmother     Endometriosis Sister     Breast Cancer Other          ROS: 10 point ROS neg other than the symptoms noted above in the HPI.    Vital Signs:   /84 (BP Location: Right arm, Patient Position: Sitting, Cuff Size: Adult Regular)   Temp 98.5  F (36.9  C) (Temporal)   Ht 5' 4\" (1.626 m)   Wt 230 lb (104.3 kg)   LMP 06/09/2025 (Approximate)   BMI 39.48 kg/m        Examination:  Constitutional:  Alert, well nourished, NAD.  Memory: recent and remote memory   HEENT: Normocephalic, atraumatic.   Pulm:  Without shortness of breath   CV:  No pitting edema of BLE.      Neurological:  Awake  Alert  Oriented x 3  Speech clear    Motor exam:   Shoulder Abduction:   Right:  5/5   Left:  5/5  Biceps:                        Right:  5/5   Left:  5/5  Triceps:                       Right:  5/5   Left:  5/5  Wrist Extensors:          Right:  5/5   Left:  5/5  Wrist Flexors:              Right:  5/5   Left:  5/5  Intrinsics:                     Right:  5/5   Left:  5/5    Sensation normal to bilateral upper and lower extremities  Muscle tone to bilateral upper and lower extremities   Gait: Able to stand from a seated position. Normal non-antalgic, non-myelopathic gait.  Able to heel/toe walk without loss of balance    Cervical examination reveals good range of motion.  No tenderness to palpation of the cervical spine or paraspinous muscles " bilaterally.    Imaging:   Narrative & Impression   EXAM: MR CERVICAL SPINE W/O CONTRAST  LOCATION: [facility]  DATE: 7/2/2025     INDICATION: Chronic neck and left shoulder pain  COMPARISON: None  TECHNIQUE: MRI Cervical Spine without IV contrast     FINDINGS:  Mildly motion degraded exam.     Slight reversal of the normal cervical lordosis without significant spondylolisthesis. Maintained vertebral body heights. Mild intervertebral disc desiccation at C3-C4, to a lesser extent at C4-C5. No significant intervertebral disc height loss. No   suspicious bone marrow signal intensity or significant focal edema. Mild Modic type II and III changes at C3-C4, trace at C4-C5. Within the limitation of motion artifact, no abnormal spinal cord signal intensity. Incidental subcentimeter T2-hyperintense   right thyroid lobe nodule which does not require further imaging per size criteria and may represent a colloid cyst. Otherwise, grossly unremarkable visualized extraspinal structures.     Craniovertebral junction and C1-C2: No significant degenerative change or spinal canal stenosis.     C2-C3: Mild right uncovertebral and facet arthrosis with mild right foraminal narrowing. No significant disc degenerative change, spinal canal stenosis, or left foraminal narrowing.      C3-C4: A posterior disc-osteophyte complex effaces the ventral spinal cord and, along with mild buckling of the left ligamentum flavum, contributes to mild spinal canal stenosis. Bilateral uncovertebral and facet arthrosis, slightly worse on the right   where there is mild foraminal narrowing. No significant left foraminal stenosis.     C4-C5: A posterior disc-osteophyte complex eccentric to the right effaces the right hemicord and, along with mild buckling of the ligamenta flava, contributes to mild spinal canal stenosis. Mild-moderate left and mild right foraminal narrowing   predominantly due to uncovertebral arthropathy.       C5-C6: Small posterior disc-osteophyte complex effacing the ventral thecal sac without significant spinal canal stenosis. Mild bilateral uncovertebral arthropathy with mild left foraminal narrowing. No significant right foraminal stenosis.      C6-C7: No significant disc degenerative change or spinal canal stenosis. Mild left foraminal narrowing secondary to uncovertebral and facet arthrosis without significant right foraminal stenosis.      C7-T1: Small posterior disc-osteophyte complex including a left central disc protrusion without significant spinal canal stenosis. Bilateral uncovertebral and facet arthrosis, worse on the left where there is mild foraminal narrowing. No significant   right foraminal stenosis.                                                                      IMPRESSION:  1.  Multilevel spondylosis with mild spinal canal stenosis at C3-C5. No abnormal spinal cord signal intensity.  2.  Multilevel overall left-sided foraminal narrowing, worst and mild-moderate at C4-C5 on the left. See above for details.     Assessment/Plan:   Neck pain  Left shoulder pain  Left clavicle pain  Left arm pain  Left arm paresthesias    Reviewed cervical MRI in clinic. Ok to begin PT for neck symptoms. Will refer to orthopedics for evaluation for possible TOS. She verbalized understanding and agreement.     Patient Instructions   -Referral to physical therapy for neck concerns.   -Referral to orthopedics for evaluation of thoracic outlet syndrome. They will contact you to schedule.   -Please contact our clinic with questions or concerns at [phone].    Janelle Dimas, [facility]  [address] [place] [id number]  Tel [phone]  Fax [phone]

## 2025-07-10 NOTE — LETTER
"7/10/2025      Rosario Gibson  [address] [address] [place] [address]      Dear Colleague,    Thank you for referring your patient, Rosario Gibson, to the [facility] [place]. Please see a copy of my visit note below.    Rosario Gibson is a 30 year old female who presents for:  Chief Complaint   Patient presents with     Neurologic Problem        Initial Vitals:  /84 (BP Location: Right arm, Patient Position: Sitting, Cuff Size: Adult Regular)   Temp 98.5  F (36.9  C) (Temporal)   Ht 5' 4\" (1.626 m)   Wt 230 lb (104.3 kg)   LMP 06/09/2025 (Approximate)   BMI 39.48 kg/m   Estimated body mass index is 39.48 kg/m  as calculated from the following:    Height as of this encounter: 5' 4\" (1.626 m).    Weight as of this encounter: 230 lb (104.3 kg).. Body surface area is 2.17 meters squared. BP completed using cuff size: regular  Severe Pain (7)    Nursing Comments:    Fatimah Ramirez LPN      [facility]  Neurosurgery Clinic Visit      CC: neck, shoulder, and arm pain    Primary care Provider: Liya Garcia    Reason For Visit:   I was asked by Sharri Bennett NP to consult on the patient for chronic neck pain.    HPI: Rosario Gibson is a 30 year old female with a 2 year history of neck pain who presents for evaluation. She describes right-sided neck pain as well as left shoulder and clavicle pain to the left arm and into the left 4th and 5th digits. Reports associated paresthesias. Symptoms worse in the morning after her arms have been elevated over her head. No overt weakness. Has undergone PT for her left shoulder without any improvement.     Past Medical History:   Diagnosis Date     Acne      Allergic rhinitis      Obesity        Past Medical History reviewed with patient during visit.    Past Surgical History:   Procedure Laterality Date     NO HISTORY OF SURGERY       Past Surgical History reviewed with patient " during visit.    Current Outpatient Medications   Medication Sig Dispense Refill     calcium carbonate antacid (TUMS ULTRA 1000) 1000 MG CHEW Take 3,000 mg by mouth once as needed (heartburn).       hydrOXYzine HCl (ATARAX) 25 MG tablet Take 1-2 tablets (25-50 mg) by mouth 2 times daily as needed for anxiety. 60 tablet 0     naproxen sodium 220 MG capsule Take 220 mg by mouth 2 times daily (with meals).       cyclobenzaprine (FLEXERIL) 10 MG tablet Take 1 tablet (10 mg) by mouth 3 times daily as needed for muscle spasms. (Patient not taking: Reported on 7/10/2025) 15 tablet 0     No current facility-administered medications for this visit.       No Known Allergies    Social History     Socioeconomic History     Marital status: Single     Spouse name: None     Number of children: None     Years of education: None     Highest education level: None   Occupational History     Employer: STUDENT     Employer: OTHER     Comment: [employer]   Tobacco Use     Smoking status: Former     Current packs/day: 0.00     Average packs/day: 0.4 packs/day for 19.0 years (8.4 ttl pk-yrs)     Types: Cigarettes     Start date: 2013     Quit date: 6/10/2025     Years since quittin.0     Passive exposure: Past     Smokeless tobacco: Never   Vaping Use     Vaping status: Never Used   Substance and Sexual Activity     Alcohol use: No     Drug use: No     Sexual activity: Yes     Partners: Male     Birth control/protection: Pull-out method, Condom, Natural Family Planning   Other Topics Concern     Parent/sibling w/ CABG, MI or angioplasty before 65F 55M? No     Social Drivers of Health     Financial Resource Strain: Low Risk  (3/30/2025)    Financial Resource Strain      Within the past 12 months, have you or your family members you live with been unable to get utilities (heat, electricity) when it was really needed?: No   Food Insecurity: Low Risk  (3/30/2025)    Food Insecurity      Within the past 12 months, did you worry that your  "food would run out before you got money to buy more?: No      Within the past 12 months, did the food you bought just not last and you didn t have money to get more?: No   Transportation Needs: Low Risk  (3/30/2025)    Transportation Needs      Within the past 12 months, has lack of transportation kept you from medical appointments, getting your medicines, non-medical meetings or appointments, work, or from getting things that you need?: No   Physical Activity: Insufficiently Active (3/30/2025)    Exercise Vital Sign      Days of Exercise per Week: 2 days      Minutes of Exercise per Session: 20 min   Stress: No Stress Concern Present (3/30/2025)    Citizen of [place] of Occupational Health - Occupational Stress Questionnaire      Feeling of Stress : Only a little   Social Connections: Unknown (3/30/2025)    Social Connection and Isolation Panel [NHANES]      Frequency of Social Gatherings with Friends and Family: Once a week   Interpersonal Safety: Low Risk  (4/26/2024)    Interpersonal Safety      Do you feel physically and emotionally safe where you currently live?: Yes      Within the past 12 months, have you been hit, slapped, kicked or otherwise physically hurt by someone?: No      Within the past 12 months, have you been humiliated or emotionally abused in other ways by your partner or ex-partner?: No   Housing Stability: Low Risk  (3/30/2025)    Housing Stability      Do you have housing? : Yes      Are you worried about losing your housing?: No       Family History   Problem Relation Age of Onset     Hypertension Maternal Grandmother      Diabetes Paternal Grandmother      Endometriosis Sister      Breast Cancer Other          ROS: 10 point ROS neg other than the symptoms noted above in the HPI.    Vital Signs:   /84 (BP Location: Right arm, Patient Position: Sitting, Cuff Size: Adult Regular)   Temp 98.5  F (36.9  C) (Temporal)   Ht 5' 4\" (1.626 m)   Wt 230 lb (104.3 kg)   LMP 06/09/2025 " (Approximate)   BMI 39.48 kg/m        Examination:  Constitutional:  Alert, well nourished, NAD.  Memory: recent and remote memory   HEENT: Normocephalic, atraumatic.   Pulm:  Without shortness of breath   CV:  No pitting edema of BLE.      Neurological:  Awake  Alert  Oriented x 3  Speech clear    Motor exam:   Shoulder Abduction:   Right:  5/5   Left:  5/5  Biceps:                        Right:  5/5   Left:  5/5  Triceps:                       Right:  5/5   Left:  5/5  Wrist Extensors:          Right:  5/5   Left:  5/5  Wrist Flexors:              Right:  5/5   Left:  5/5  Intrinsics:                     Right:  5/5   Left:  5/5    Sensation normal to bilateral upper and lower extremities  Muscle tone to bilateral upper and lower extremities   Gait: Able to stand from a seated position. Normal non-antalgic, non-myelopathic gait.  Able to heel/toe walk without loss of balance    Cervical examination reveals good range of motion.  No tenderness to palpation of the cervical spine or paraspinous muscles bilaterally.    Imaging:   Narrative & Impression   EXAM: MR CERVICAL SPINE W/O CONTRAST  LOCATION: [facility] [place] [facility]  DATE: 7/2/2025     INDICATION: Chronic neck and left shoulder pain  COMPARISON: None  TECHNIQUE: MRI Cervical Spine without IV contrast     FINDINGS:  Mildly motion degraded exam.     Slight reversal of the normal cervical lordosis without significant spondylolisthesis. Maintained vertebral body heights. Mild intervertebral disc desiccation at C3-C4, to a lesser extent at C4-C5. No significant intervertebral disc height loss. No   suspicious bone marrow signal intensity or significant focal edema. Mild Modic type II and III changes at C3-C4, trace at C4-C5. Within the limitation of motion artifact, no abnormal spinal cord signal intensity. Incidental subcentimeter T2-hyperintense   right thyroid lobe nodule which does not require further imaging per size criteria and may  represent a colloid cyst. Otherwise, grossly unremarkable visualized extraspinal structures.     Craniovertebral junction and C1-C2: No significant degenerative change or spinal canal stenosis.     C2-C3: Mild right uncovertebral and facet arthrosis with mild right foraminal narrowing. No significant disc degenerative change, spinal canal stenosis, or left foraminal narrowing.      C3-C4: A posterior disc-osteophyte complex effaces the ventral spinal cord and, along with mild buckling of the left ligamentum flavum, contributes to mild spinal canal stenosis. Bilateral uncovertebral and facet arthrosis, slightly worse on the right   where there is mild foraminal narrowing. No significant left foraminal stenosis.     C4-C5: A posterior disc-osteophyte complex eccentric to the right effaces the right hemicord and, along with mild buckling of the ligamenta flava, contributes to mild spinal canal stenosis. Mild-moderate left and mild right foraminal narrowing   predominantly due to uncovertebral arthropathy.      C5-C6: Small posterior disc-osteophyte complex effacing the ventral thecal sac without significant spinal canal stenosis. Mild bilateral uncovertebral arthropathy with mild left foraminal narrowing. No significant right foraminal stenosis.      C6-C7: No significant disc degenerative change or spinal canal stenosis. Mild left foraminal narrowing secondary to uncovertebral and facet arthrosis without significant right foraminal stenosis.      C7-T1: Small posterior disc-osteophyte complex including a left central disc protrusion without significant spinal canal stenosis. Bilateral uncovertebral and facet arthrosis, worse on the left where there is mild foraminal narrowing. No significant   right foraminal stenosis.                                                                      IMPRESSION:  1.  Multilevel spondylosis with mild spinal canal stenosis at C3-C5. No abnormal spinal cord signal intensity.  2.   Multilevel overall left-sided foraminal narrowing, worst and mild-moderate at C4-C5 on the left. See above for details.     Assessment/Plan:   Neck pain  Left shoulder pain  Left clavicle pain  Left arm pain  Left arm paresthesias    Reviewed cervical MRI in clinic. Ok to begin PT for neck symptoms. Will refer to orthopedics for evaluation for possible TOS. She verbalized understanding and agreement.     Patient Instructions   -Referral to physical therapy for neck concerns.   -Referral to orthopedics for evaluation of thoracic outlet syndrome. They will contact you to schedule.   -Please contact our clinic with questions or concerns at [phone].    Janelle Dimas CNP  [facility]surgery  [address] [place] [address]  Tel [phone]  Fax [phone]      Again, thank you for allowing me to participate in the care of your patient.        Sincerely,        Janelle Dimas NP    Electronically signed

## 2025-07-15 ENCOUNTER — TELEPHONE (OUTPATIENT)
Dept: ORTHOPEDICS | Facility: CLINIC | Age: 30
End: 2025-07-15
Payer: COMMERCIAL

## 2025-07-15 SDOH — HEALTH STABILITY: PHYSICAL HEALTH: ON AVERAGE, HOW MANY DAYS PER WEEK DO YOU ENGAGE IN MODERATE TO STRENUOUS EXERCISE (LIKE A BRISK WALK)?: 1 DAY

## 2025-07-15 SDOH — HEALTH STABILITY: PHYSICAL HEALTH: ON AVERAGE, HOW MANY MINUTES DO YOU ENGAGE IN EXERCISE AT THIS LEVEL?: 20 MIN

## 2025-07-15 NOTE — TELEPHONE ENCOUNTER
Left Voicemail (1st Attempt) and Sent Mychart (1st Attempt) for the patient to call back and schedule the following:    Appointment type: New Hand/Wrist  Provider: Dr. Olson or Dr. Newell  Return date: next available  Specialty phone number: [phone]

## 2025-07-15 NOTE — TELEPHONE ENCOUNTER
----- Message from Kisha YOUNG sent at 7/14/2025 10:10 AM CDT -----  Regarding: RE: TOS  To be honest, I'm not savvy in TOS. I can say that our Neurosurgery provider is the one who referred patient for evaluation of TOS so feel it Likely to be neurogenic as opposed to venous or arterial.    Yes, please assist with scheduling patient. THANK YOU!!    Kisha LEY  ----- Message -----  From: Luis Enrique Weldon RN  Sent: 7/14/2025  10:03 AM CDT  To: Kisha Nieto RN; Clinic Coordinators-Orth#  Subject: RE: TOS                                          Hi Kisha,     As long as its suspected neurogenic TOS, then its fine. They can see Dr. Olson or Dr. Newell at the [facility] or  locations. I have included our coordinators at the [facility] since we don't have a new patient opening in  until September. They could see Dr. Olson at the [facility] next Friday.     Reji Weldon RN  ----- Message -----  From: Kisha Nieto RN  Sent: 7/10/2025   3:38 PM CDT  To: Luis Enrique Weldon RN  Subject: TOS                                              Reji,    This patient needs evaluation for Thoracic outlet syndrome. Can you look at the Orthopedic referral we have placed and verify it was placed correctly?    Thanks,   Kisha LEY

## 2025-07-22 ENCOUNTER — OFFICE VISIT (OUTPATIENT)
Dept: ORTHOPEDICS | Facility: CLINIC | Age: 30
End: 2025-07-22
Attending: NURSE PRACTITIONER
Payer: COMMERCIAL

## 2025-07-22 DIAGNOSIS — G89.29 CHRONIC NECK PAIN: ICD-10-CM

## 2025-07-22 DIAGNOSIS — G54.0 NEUROGENIC THORACIC OUTLET SYNDROME: Primary | ICD-10-CM

## 2025-07-22 DIAGNOSIS — M25.512 CHRONIC LEFT SHOULDER PAIN: ICD-10-CM

## 2025-07-22 DIAGNOSIS — M54.2 CHRONIC NECK PAIN: ICD-10-CM

## 2025-07-22 DIAGNOSIS — G89.29 CHRONIC LEFT SHOULDER PAIN: ICD-10-CM

## 2025-07-22 PROCEDURE — 99204 OFFICE O/P NEW MOD 45 MIN: CPT | Performed by: STUDENT IN AN ORGANIZED HEALTH CARE EDUCATION/TRAINING PROGRAM

## 2025-07-22 NOTE — LETTER
2025      Rosario Gibson  [address] [address] [place] [id number]      Dear Colleague,    [facility] you [facility] your patient, Rosario Gibson[facility] Please see a copy of my visit note below.     Rosario Gibson  :  1995  DOS: 2025  MRN: [id number]  PCP: Liya Garcia    Sports Medicine Clinic Visit      HPI  Rosario Gibson is a 30 year old female who is seen in consultation at the request of Janelle Dimas N.P. presenting with chronic left shoulder pain.    - Mechanism of Injury:    - No inciting injury  - Pertinent history and prior evaluations:    - MRI cervical 2025 shows  1.  Multilevel spondylosis with mild spinal canal stenosis at C3-C5. No abnormal spinal cord signal intensity.  2.  Multilevel overall left-sided foraminal narrowing, worst and mild-moderate at C4-C5 on the left. See above for details.    - MRI left shoulder 2025 shows   1. Mild tendinopathy of the supraspinatus tendon at the footplate, the  junctional fibers of the posterior supraspinatus tendon with the  superior infraspinatus tendon.   2. No large joint effusion, abnormal marrow signal changes or  significant glenohumeral or acromioclavicular degenerative changes.    - Pain Character:    - Location: Left-sided neck into left shoulder and radiates down to the 4-5th digits (burning/tingling pain and paresthesias) intermittently  - Character:  numbness, burning pain, occasionally sharp pain and pressure  - Duration:  2 years  - Course:  worsening  - Endorses:    - nausea, vertigo, numbness, tingling in the lower trunk distribution of the left upper extremity.  Notes that the pain can be intense enough to cause panic attacks.  Weakness at the hand > shoulder.  Shooting pain occasionally down the left arm.  - Denies:    - swelling, clicking/popping, grinding  - Alleviating factors:    - lying down, rest, activity  modifications  - Aggravating factors:    - arm elevated over head at night cause numbness, overhead activities, repetitive handgrip and repetitive shoulder motions  - Other treatments tried:    - physical therapy, ice, heat, Aleve    - Patient Goals:    - evaluation for TOS, formal diagnosis, treatment options  - Social History:   - Active mother      Review of Systems  Musculoskeletal: as above  Remainder of review of systems is negative including constitutional, CV, pulmonary, GI, Skin and Neurologic except as noted in HPI or medical history.    Past Medical History:   Diagnosis Date     Acne      Allergic rhinitis      Obesity      Past Surgical History:   Procedure Laterality Date     NO HISTORY OF SURGERY       Family History   Problem Relation Age of Onset     Hypertension Maternal Grandmother      Diabetes Paternal Grandmother      Endometriosis Sister      Breast Cancer Other          Objective  LMP 06/09/2025 (Approximate)     General: healthy, alert and in no acute distress.    HEENT: no scleral icterus or conjunctival erythema.   Skin: no suspicious lesions or rash. No jaundice.   CV: regular rhythm by palpation, 2+ distal pulses.  Resp: normal respiratory effort without conversational dyspnea.   Psych: normal mood and affect.    Gait: nonantalgic, appropriate coordination and balance.     Neuro:        - Sensation to light touch:    - Diminished sensation in the posterolateral upper arm, medial forearm, and digits 4-5. Otherwise SILT in all other nerve distributions.        - MSR:       RUE  LUE  - Biceps  2+ 2+  - Brachioradialis 2+ 2+  - Triceps  2+ 2+       - Special tests:   - Spurling's:  Neg    - Doan:  Neg   - Phalen's:  Neg    MSK - Shoulder:       - Inspection:    - No significant swelling, erythema, warmth, ecchymosis, lesion, or atrophy noted.        - ROM:    - Full AROM/PROM        - Palpation:    - NTTP       - Strength:  (*antalgic)  - Shoulder Abduction   4    - Shoulder  Flexion   5-    - Shoulder Internal Rotation  5-    - Shoulder External Rotation  4+   - Elbow Flexion   5-   - Elbow Extension   4+   - Forearm Pronation   5-   - Forearm Supination   5-   - Wrist Extension   5-   - Wrist Flexion    5-   - FDI     4   - ADM     4   - FPL     4   - APB     4   - EIP     4   - EDC     4   - APL/EPB    4            - Special tests:        - Mcneil:  Neg    - Neers:  Neg    - Empty can:  Neg for pain and weakness    - [place]:  Neg    - Scarf:  Neg    - Speeds:  Neg    - Yergason:  Neg    - Apprehension/Relocation:  Neg    - Kailyn's:  ++Pos on the left   - Adson:  Neg      Radiology  I independently reviewed the available relevant imaging in the chart with my interpretations as above in HPI.       Assessment  1. Neurogenic thoracic outlet syndrome    2. Chronic neck pain    3. Chronic left shoulder pain        Plan  Rosario Gibson is a pleasant 30 year old female that presents with chronic left neck and left upper extremity pain and paresthesias.  She describes pain in the left-sided neck that radiates down the left shoulder and upper extremity digits 4-5 intermittently.  Especially painful with overhead activities, sleeping positions, and especially with the arms elevated at night.  She will feel occasional symptoms throughout the day, but most often exacerbated with her arm elevated.  She describes numbness/tingling in the posterolateral upper arm, medial forearm, and digits 4-5 that is intermittent and related to the positioning as noted above.  Occasional shooting pain in the same distribution that feels electrical in nature.  She has noticed handgrip weakness and some weakness in the arm as well with elbow extension, pushing, and shoulder abduction.      She has had advanced imaging with MRIs of the left shoulder and cervical spine.  Left shoulder MRI was relatively normal with only some mild tendinopathy present in the supraspinatus tendon.  MRI of the cervical spine  shows mild spinal canal stenosis at C3-C5 and moderate neural foraminal narrowing at C4-C5 on the left.  Evaluated with neurosurgery and felt to be noncontributory.    On exam, she does have distal upper extremity weakness greater than proximal upper extremity weakness, decreased sensation in the lower trunk distribution of the upper extremity, and strikingly positive Kayla test.  Negative Spurling and Phalen's tests, reflexes are physiologic and symmetric.  Rotator cuff testing largely preserved without concern for tear.    Based on her history, imaging, and physical exam today, there remains a reasonable concern that her symptoms are characteristic of neurogenic thoracic outlet syndrome on the left.      We discussed the nature of the condition and available treatment options, and mutually agreed upon the following plan:    - Imaging:          - Reviewed and independently interpreted the relevant imaging in the chart, including any imaging ordered for today's clinic.  - Reviewed results and images with patient.   - EMG LUE order placed today for evaluation of nTOS and to rule out radiculopathy, plexopathy, compressive mononeuropathy  - Medications:          - Discussed pharmacologic options for pain relief.   - May use NSAIDs (Ibuprofen, Naproxen) or Acetaminophen (Tylenol) as needed for pain control.   - Do not take these if previously advised to avoid them for other medical conditions.  - May also use topical medications such as lidocaine, IcyHot, BioFreeze, or Voltaren gel as needed for pain control.    - Voltaren gel is an anti-inflammatory cream that may be used up to 4 times per day over the painful area.   - May consider neuropathic pain medications depending on the results of our workup.  - Injections:          - Discussed possible injection options and alternatives.    - Injection options include: Diagnostic scalene block.  Order has been placed for this to be performed through the pain management  clinic.  - Therapy:          - Discussed the benefits of therapy vs home exercise program for optimization of range of motion, flexibility, strength, stability and function.   - Likely will benefit from physical therapy, referral will be placed once diagnostic workup has been completed.  - Modalities:          - May use ice, heat, massage or other modalities as needed.   - Surgery:          - Discussed non-operative and operative treatment options for the patient's condition.  nTOS surgery could be considered if diagnostic workup is positive.  Will consider referral at that time.  - Activity:          - Encouraged to remain active and participate in regular activities as symptoms allow.   Avoid or modify exacerbating activities as needed.  - Follow up:          - We will follow-up after the EMG and scalene block have been completed.  We did least 2 weeks after the scalene block for follow-up visit.  - May follow up sooner for new/worsening symptoms.  - May contact clinic by phone or MyChart for questions or concerns.       William Vivar DO, CATARINAM  [facility] - Sports Medicine  [facility] - Department of Orthopedic Surgery       Disclaimer:  This note was prepared and written using Dragon Medical dictation software. As a result, there may be errors in the script that have gone undetected. Please consider this when interpreting the information in this note.      Again, thank you for allowing me to participate in the care of your patient.        Sincerely,        William Vivar DO    Electronically signed

## 2025-07-22 NOTE — PROGRESS NOTES
Rosario Gibson  :  1995  DOS: 2025  MRN: [id number]  PCP: Liya Garcia    Sports Medicine Clinic Visit      HPI  Rosario Gibson is a 30 year old female who is seen in consultation at the request of Janelle Dimas N.P. presenting with chronic left shoulder pain.    - Mechanism of Injury:    - No inciting injury  - Pertinent history and prior evaluations:    - MRI cervical 2025 shows  1.  Multilevel spondylosis with mild spinal canal stenosis at C3-C5. No abnormal spinal cord signal intensity.  2.  Multilevel overall left-sided foraminal narrowing, worst and mild-moderate at C4-C5 on the left. See above for details.    - MRI left shoulder 2025 shows   1. Mild tendinopathy of the supraspinatus tendon at the footplate, the  junctional fibers of the posterior supraspinatus tendon with the  superior infraspinatus tendon.   2. No large joint effusion, abnormal marrow signal changes or  significant glenohumeral or acromioclavicular degenerative changes.    - Pain Character:    - Location: Left-sided neck into left shoulder and radiates down to the 4-5th digits (burning/tingling pain and paresthesias) intermittently  - Character:  numbness, burning pain, occasionally sharp pain and pressure  - Duration:  2 years  - Course:  worsening  - Endorses:    - nausea, vertigo, numbness, tingling in the lower trunk distribution of the left upper extremity.  Notes that the pain can be intense enough to cause panic attacks.  Weakness at the hand > shoulder.  Shooting pain occasionally down the left arm.  - Denies:    - swelling, clicking/popping, grinding  - Alleviating factors:    - lying down, rest, activity modifications  - Aggravating factors:    - arm elevated over head at night cause numbness, overhead activities, repetitive handgrip and repetitive shoulder motions  - Other treatments tried:    - physical therapy, ice, heat, Aleve    - Patient Goals:    - evaluation for TOS,  formal diagnosis, treatment options  - Social History:   - Active mother      Review of Systems  Musculoskeletal: as above  Remainder of review of systems is negative including constitutional, CV, pulmonary, GI, Skin and Neurologic except as noted in HPI or medical history.    Past Medical History:   Diagnosis Date    Acne     Allergic rhinitis     Obesity      Past Surgical History:   Procedure Laterality Date    NO HISTORY OF SURGERY       Family History   Problem Relation Age of Onset    Hypertension Maternal Grandmother     Diabetes Paternal Grandmother     Endometriosis Sister     Breast Cancer Other          Objective  LMP 06/09/2025 (Approximate)     General: healthy, alert and in no acute distress.    HEENT: no scleral icterus or conjunctival erythema.   Skin: no suspicious lesions or rash. No jaundice.   CV: regular rhythm by palpation, 2+ distal pulses.  Resp: normal respiratory effort without conversational dyspnea.   Psych: normal mood and affect.    Gait: nonantalgic, appropriate coordination and balance.     Neuro:        - Sensation to light touch:    - Diminished sensation in the posterolateral upper arm, medial forearm, and digits 4-5. Otherwise SILT in all other nerve distributions.        - MSR:       RUE  LUE  - Biceps  2+ 2+  - Brachioradialis 2+ 2+  - Triceps  2+ 2+       - Special tests:   - Spurling's:  Neg    - Doan:  Neg   - Phalen's:  Neg    MSK - Shoulder:       - Inspection:    - No significant swelling, erythema, warmth, ecchymosis, lesion, or atrophy noted.        - ROM:    - Full AROM/PROM        - Palpation:    - NTTP       - Strength:  (*antalgic)  - Shoulder Abduction   4    - Shoulder Flexion   5-    - Shoulder Internal Rotation  5-    - Shoulder External Rotation  4+   - Elbow Flexion   5-   - Elbow Extension   4+   - Forearm Pronation   5-   - Forearm Supination   5-   - Wrist Extension   5-   - Wrist Flexion    5-   - FDI     4   - ADM     4   - FPL     4   - APB     4   -  EIP     4   - EDC     4   - APL/EPB    4            - Special tests:        - Mcneil:  Neg    - Neers:  Neg    - Empty can:  Neg for pain and weakness    - [place]:  Neg    - Scarf:  Neg    - Speeds:  Neg    - Yergason:  Neg    - Apprehension/Relocation:  Neg    - Kailyn's:  ++Pos on the left   - Adson:  Neg      Radiology  I independently reviewed the available relevant imaging in the chart with my interpretations as above in HPI.       Assessment  1. Neurogenic thoracic outlet syndrome    2. Chronic neck pain    3. Chronic left shoulder pain        Plan  Rosario Gibson is a pleasant 30 year old female that presents with chronic left neck and left upper extremity pain and paresthesias.  She describes pain in the left-sided neck that radiates down the left shoulder and upper extremity digits 4-5 intermittently.  Especially painful with overhead activities, sleeping positions, and especially with the arms elevated at night.  She will feel occasional symptoms throughout the day, but most often exacerbated with her arm elevated.  She describes numbness/tingling in the posterolateral upper arm, medial forearm, and digits 4-5 that is intermittent and related to the positioning as noted above.  Occasional shooting pain in the same distribution that feels electrical in nature.  She has noticed handgrip weakness and some weakness in the arm as well with elbow extension, pushing, and shoulder abduction.      She has had advanced imaging with MRIs of the left shoulder and cervical spine.  Left shoulder MRI was relatively normal with only some mild tendinopathy present in the supraspinatus tendon.  MRI of the cervical spine shows mild spinal canal stenosis at C3-C5 and moderate neural foraminal narrowing at C4-C5 on the left.  Evaluated with neurosurgery and felt to be noncontributory.    On exam, she does have distal upper extremity weakness greater than proximal upper extremity weakness, decreased sensation in the  lower trunk distribution of the upper extremity, and strikingly positive Kayla test.  Negative Spurling and Phalen's tests, reflexes are physiologic and symmetric.  Rotator cuff testing largely preserved without concern for tear.    Based on her history, imaging, and physical exam today, there remains a reasonable concern that her symptoms are characteristic of neurogenic thoracic outlet syndrome on the left.      We discussed the nature of the condition and available treatment options, and mutually agreed upon the following plan:    - Imaging:          - Reviewed and independently interpreted the relevant imaging in the chart, including any imaging ordered for today's clinic.  - Reviewed results and images with patient.   - EMG LUE order placed today for evaluation of nTOS and to rule out radiculopathy, plexopathy, compressive mononeuropathy  - Medications:          - Discussed pharmacologic options for pain relief.   - May use NSAIDs (Ibuprofen, Naproxen) or Acetaminophen (Tylenol) as needed for pain control.   - Do not take these if previously advised to avoid them for other medical conditions.  - May also use topical medications such as lidocaine, IcyHot, BioFreeze, or Voltaren gel as needed for pain control.    - Voltaren gel is an anti-inflammatory cream that may be used up to 4 times per day over the painful area.   - May consider neuropathic pain medications depending on the results of our workup.  - Injections:          - Discussed possible injection options and alternatives.    - Injection options include: Diagnostic scalene block.  Order has been placed for this to be performed through the pain management clinic.  - Therapy:          - Discussed the benefits of therapy vs home exercise program for optimization of range of motion, flexibility, strength, stability and function.   - Likely will benefit from physical therapy, referral will be placed once diagnostic workup has been completed.  - Modalities:           - May use ice, heat, massage or other modalities as needed.   - Surgery:          - Discussed non-operative and operative treatment options for the patient's condition.  nTOS surgery could be considered if diagnostic workup is positive.  Will consider referral at that time.  - Activity:          - Encouraged to remain active and participate in regular activities as symptoms allow.   Avoid or modify exacerbating activities as needed.  - Follow up:          - We will follow-up after the EMG and scalene block have been completed.  We did least 2 weeks after the scalene block for follow-up visit.  - May follow up sooner for new/worsening symptoms.  - May contact clinic by phone or MyChart for questions or concerns.       William Vivar DO, CONI  [facility] - Sports Medicine  [facility] - Department of Orthopedic Surgery       Disclaimer:  This note was prepared and written using Dragon Medical dictation software. As a result, there may be errors in the script that have gone undetected. Please consider this when interpreting the information in this note.

## 2025-08-04 ENCOUNTER — TRANSFERRED RECORDS (OUTPATIENT)
Dept: HEALTH INFORMATION MANAGEMENT | Facility: CLINIC | Age: 30
End: 2025-08-04

## 2025-08-04 ENCOUNTER — THERAPY VISIT (OUTPATIENT)
Dept: PHYSICAL THERAPY | Facility: CLINIC | Age: 30
End: 2025-08-04
Attending: NURSE PRACTITIONER
Payer: COMMERCIAL

## 2025-08-04 DIAGNOSIS — M54.2 CHRONIC NECK PAIN: ICD-10-CM

## 2025-08-04 DIAGNOSIS — M79.602 LEFT ARM PAIN: ICD-10-CM

## 2025-08-04 DIAGNOSIS — G89.29 CHRONIC NECK PAIN: ICD-10-CM

## 2025-08-04 PROCEDURE — 97110 THERAPEUTIC EXERCISES: CPT | Mod: GP | Performed by: PHYSICAL THERAPIST

## 2025-08-04 PROCEDURE — 97161 PT EVAL LOW COMPLEX 20 MIN: CPT | Mod: GP | Performed by: PHYSICAL THERAPIST

## 2025-08-04 PROCEDURE — 97140 MANUAL THERAPY 1/> REGIONS: CPT | Mod: GP | Performed by: PHYSICAL THERAPIST

## 2025-08-05 PROBLEM — G89.29 CHRONIC NECK PAIN: Status: ACTIVE | Noted: 2025-08-05

## 2025-08-05 PROBLEM — M79.602 LEFT ARM PAIN: Status: ACTIVE | Noted: 2025-08-05

## 2025-08-05 PROBLEM — M54.2 CHRONIC NECK PAIN: Status: ACTIVE | Noted: 2025-08-05

## 2025-08-06 DIAGNOSIS — M54.2 CHRONIC NECK PAIN: ICD-10-CM

## 2025-08-06 DIAGNOSIS — M25.512 CHRONIC LEFT SHOULDER PAIN: ICD-10-CM

## 2025-08-06 DIAGNOSIS — M62.838 MUSCLE SPASM: ICD-10-CM

## 2025-08-06 DIAGNOSIS — M79.10 MYALGIA: ICD-10-CM

## 2025-08-06 DIAGNOSIS — G89.29 CHRONIC NECK PAIN: ICD-10-CM

## 2025-08-06 DIAGNOSIS — G54.0 NEUROGENIC THORACIC OUTLET SYNDROME: Primary | ICD-10-CM

## 2025-08-06 DIAGNOSIS — G89.29 CHRONIC LEFT SHOULDER PAIN: ICD-10-CM

## 2025-08-11 ENCOUNTER — THERAPY VISIT (OUTPATIENT)
Dept: PHYSICAL THERAPY | Facility: CLINIC | Age: 30
End: 2025-08-11
Attending: NURSE PRACTITIONER
Payer: COMMERCIAL

## 2025-08-11 DIAGNOSIS — M54.2 CHRONIC NECK PAIN: ICD-10-CM

## 2025-08-11 DIAGNOSIS — G89.29 CHRONIC NECK PAIN: ICD-10-CM

## 2025-08-11 DIAGNOSIS — M79.602 LEFT ARM PAIN: Primary | ICD-10-CM

## 2025-08-11 PROCEDURE — 97110 THERAPEUTIC EXERCISES: CPT | Mod: GP | Performed by: PHYSICAL THERAPIST

## 2025-08-11 PROCEDURE — 97140 MANUAL THERAPY 1/> REGIONS: CPT | Mod: GP | Performed by: PHYSICAL THERAPIST

## 2025-08-18 ENCOUNTER — TELEPHONE (OUTPATIENT)
Dept: PHYSICAL MEDICINE AND REHAB | Facility: CLINIC | Age: 30
End: 2025-08-18
Payer: COMMERCIAL

## 2025-08-18 ENCOUNTER — THERAPY VISIT (OUTPATIENT)
Dept: PHYSICAL THERAPY | Facility: CLINIC | Age: 30
End: 2025-08-18
Attending: NURSE PRACTITIONER
Payer: COMMERCIAL

## 2025-08-18 DIAGNOSIS — G89.29 CHRONIC NECK PAIN: ICD-10-CM

## 2025-08-18 DIAGNOSIS — M54.2 CHRONIC NECK PAIN: ICD-10-CM

## 2025-08-18 DIAGNOSIS — M79.602 LEFT ARM PAIN: Primary | ICD-10-CM

## 2025-08-18 PROBLEM — M79.10 MYALGIA: Status: ACTIVE | Noted: 2025-08-06

## 2025-08-18 PROBLEM — G54.0 NEUROGENIC THORACIC OUTLET SYNDROME: Status: ACTIVE | Noted: 2025-08-06

## 2025-08-18 PROBLEM — M62.838 MUSCLE SPASM: Status: ACTIVE | Noted: 2025-08-06

## 2025-08-18 PROCEDURE — 97140 MANUAL THERAPY 1/> REGIONS: CPT | Mod: GP | Performed by: PHYSICAL THERAPIST

## 2025-08-24 ASSESSMENT — ANXIETY QUESTIONNAIRES
6. BECOMING EASILY ANNOYED OR IRRITABLE: SEVERAL DAYS
8. IF YOU CHECKED OFF ANY PROBLEMS, HOW DIFFICULT HAVE THESE MADE IT FOR YOU TO DO YOUR WORK, TAKE CARE OF THINGS AT HOME, OR GET ALONG WITH OTHER PEOPLE?: VERY DIFFICULT
IF YOU CHECKED OFF ANY PROBLEMS ON THIS QUESTIONNAIRE, HOW DIFFICULT HAVE THESE PROBLEMS MADE IT FOR YOU TO DO YOUR WORK, TAKE CARE OF THINGS AT HOME, OR GET ALONG WITH OTHER PEOPLE: VERY DIFFICULT
3. WORRYING TOO MUCH ABOUT DIFFERENT THINGS: NEARLY EVERY DAY
5. BEING SO RESTLESS THAT IT IS HARD TO SIT STILL: NOT AT ALL
2. NOT BEING ABLE TO STOP OR CONTROL WORRYING: MORE THAN HALF THE DAYS
7. FEELING AFRAID AS IF SOMETHING AWFUL MIGHT HAPPEN: NEARLY EVERY DAY
4. TROUBLE RELAXING: NEARLY EVERY DAY
1. FEELING NERVOUS, ANXIOUS, OR ON EDGE: NEARLY EVERY DAY
GAD7 TOTAL SCORE: 15
7. FEELING AFRAID AS IF SOMETHING AWFUL MIGHT HAPPEN: NEARLY EVERY DAY
GAD7 TOTAL SCORE: 15
GAD7 TOTAL SCORE: 15

## 2025-08-25 ENCOUNTER — OFFICE VISIT (OUTPATIENT)
Dept: FAMILY MEDICINE | Facility: OTHER | Age: 30
End: 2025-08-25
Payer: COMMERCIAL

## 2025-08-25 VITALS
TEMPERATURE: 98.5 F | WEIGHT: 225 LBS | HEIGHT: 64 IN | SYSTOLIC BLOOD PRESSURE: 126 MMHG | DIASTOLIC BLOOD PRESSURE: 88 MMHG | OXYGEN SATURATION: 98 % | BODY MASS INDEX: 38.41 KG/M2 | RESPIRATION RATE: 16 BRPM | HEART RATE: 85 BPM

## 2025-08-25 DIAGNOSIS — E28.2 PCOS (POLYCYSTIC OVARIAN SYNDROME): ICD-10-CM

## 2025-08-25 DIAGNOSIS — R73.03 PREDIABETES: ICD-10-CM

## 2025-08-25 DIAGNOSIS — M62.838 MUSCLE SPASM: ICD-10-CM

## 2025-08-25 DIAGNOSIS — F32.2 CURRENT SEVERE EPISODE OF MAJOR DEPRESSIVE DISORDER WITHOUT PSYCHOTIC FEATURES, UNSPECIFIED WHETHER RECURRENT (H): Primary | ICD-10-CM

## 2025-08-25 LAB
ALBUMIN SERPL BCG-MCNC: 4.6 G/DL (ref 3.5–5.2)
ALP SERPL-CCNC: 67 U/L (ref 40–150)
ALT SERPL W P-5'-P-CCNC: 38 U/L (ref 0–50)
ANION GAP SERPL CALCULATED.3IONS-SCNC: 13 MMOL/L (ref 7–15)
AST SERPL W P-5'-P-CCNC: 28 U/L (ref 0–45)
BILIRUB SERPL-MCNC: 0.9 MG/DL
BUN SERPL-MCNC: 8.6 MG/DL (ref 6–20)
CALCIUM SERPL-MCNC: 10.2 MG/DL (ref 8.8–10.4)
CHLORIDE SERPL-SCNC: 102 MMOL/L (ref 98–107)
CREAT SERPL-MCNC: 0.77 MG/DL (ref 0.51–0.95)
EGFRCR SERPLBLD CKD-EPI 2021: >90 ML/MIN/1.73M2
ERYTHROCYTE [DISTWIDTH] IN BLOOD BY AUTOMATED COUNT: 12.2 % (ref 10–15)
EST. AVERAGE GLUCOSE BLD GHB EST-MCNC: 123 MG/DL
FERRITIN SERPL-MCNC: 95 NG/ML (ref 6–175)
FOLATE SERPL-MCNC: 18.1 NG/ML (ref 4.6–34.8)
GLUCOSE SERPL-MCNC: 102 MG/DL (ref 70–99)
HBA1C MFR BLD: 5.9 % (ref 0–5.6)
HCO3 SERPL-SCNC: 25 MMOL/L (ref 22–29)
HCT VFR BLD AUTO: 46.7 % (ref 35–47)
HGB BLD-MCNC: 15.3 G/DL (ref 11.7–15.7)
IRON BINDING CAPACITY (ROCHE): 371 UG/DL (ref 240–430)
IRON SATN MFR SERPL: 19 % (ref 15–46)
IRON SERPL-MCNC: 70 UG/DL (ref 37–145)
MCH RBC QN AUTO: 29 PG (ref 26.5–33)
MCHC RBC AUTO-ENTMCNC: 32.8 G/DL (ref 31.5–36.5)
MCV RBC AUTO: 88.6 FL (ref 78–100)
PLATELET # BLD AUTO: 418 10E3/UL (ref 150–450)
POTASSIUM SERPL-SCNC: 5 MMOL/L (ref 3.4–5.3)
PROT SERPL-MCNC: 7.9 G/DL (ref 6.4–8.3)
RBC # BLD AUTO: 5.27 10E6/UL (ref 3.8–5.2)
SODIUM SERPL-SCNC: 140 MMOL/L (ref 135–145)
T4 FREE SERPL-MCNC: 1.17 NG/DL (ref 0.9–1.7)
TSH SERPL DL<=0.005 MIU/L-ACNC: 1.13 UIU/ML (ref 0.3–4.2)
WBC # BLD AUTO: 9 10E3/UL (ref 4–11)

## 2025-08-25 PROCEDURE — 3044F HG A1C LEVEL LT 7.0%: CPT | Performed by: PHYSICIAN ASSISTANT

## 2025-08-25 PROCEDURE — 99214 OFFICE O/P EST MOD 30 MIN: CPT | Performed by: PHYSICIAN ASSISTANT

## 2025-08-25 PROCEDURE — 82728 ASSAY OF FERRITIN: CPT | Performed by: PHYSICIAN ASSISTANT

## 2025-08-25 PROCEDURE — 85027 COMPLETE CBC AUTOMATED: CPT | Performed by: PHYSICIAN ASSISTANT

## 2025-08-25 PROCEDURE — 83550 IRON BINDING TEST: CPT | Performed by: PHYSICIAN ASSISTANT

## 2025-08-25 PROCEDURE — 82746 ASSAY OF FOLIC ACID SERUM: CPT | Performed by: PHYSICIAN ASSISTANT

## 2025-08-25 PROCEDURE — G2211 COMPLEX E/M VISIT ADD ON: HCPCS | Performed by: PHYSICIAN ASSISTANT

## 2025-08-25 PROCEDURE — 82306 VITAMIN D 25 HYDROXY: CPT | Performed by: PHYSICIAN ASSISTANT

## 2025-08-25 PROCEDURE — 3079F DIAST BP 80-89 MM HG: CPT | Performed by: PHYSICIAN ASSISTANT

## 2025-08-25 PROCEDURE — 1125F AMNT PAIN NOTED PAIN PRSNT: CPT | Performed by: PHYSICIAN ASSISTANT

## 2025-08-25 PROCEDURE — 83036 HEMOGLOBIN GLYCOSYLATED A1C: CPT | Performed by: PHYSICIAN ASSISTANT

## 2025-08-25 PROCEDURE — 84443 ASSAY THYROID STIM HORMONE: CPT | Performed by: PHYSICIAN ASSISTANT

## 2025-08-25 PROCEDURE — 3074F SYST BP LT 130 MM HG: CPT | Performed by: PHYSICIAN ASSISTANT

## 2025-08-25 PROCEDURE — 84439 ASSAY OF FREE THYROXINE: CPT | Performed by: PHYSICIAN ASSISTANT

## 2025-08-25 PROCEDURE — 36415 COLL VENOUS BLD VENIPUNCTURE: CPT | Performed by: PHYSICIAN ASSISTANT

## 2025-08-25 PROCEDURE — 80053 COMPREHEN METABOLIC PANEL: CPT | Performed by: PHYSICIAN ASSISTANT

## 2025-08-25 PROCEDURE — 82607 VITAMIN B-12: CPT | Performed by: PHYSICIAN ASSISTANT

## 2025-08-25 PROCEDURE — 83540 ASSAY OF IRON: CPT | Performed by: PHYSICIAN ASSISTANT

## 2025-08-25 RX ORDER — CLONAZEPAM 0.5 MG/1
0.5 TABLET ORAL 3 TIMES DAILY PRN
Qty: 30 TABLET | Refills: 1 | Status: SHIPPED | OUTPATIENT
Start: 2025-08-25

## 2025-08-25 RX ORDER — CYCLOBENZAPRINE HCL 10 MG
10 TABLET ORAL 3 TIMES DAILY PRN
Qty: 30 TABLET | Refills: 2 | Status: SHIPPED | OUTPATIENT
Start: 2025-08-25

## 2025-08-25 RX ORDER — METFORMIN HYDROCHLORIDE 500 MG/1
500 TABLET, EXTENDED RELEASE ORAL
Qty: 90 TABLET | Refills: 1 | Status: SHIPPED | OUTPATIENT
Start: 2025-08-25

## 2025-08-25 ASSESSMENT — PAIN SCALES - GENERAL: PAINLEVEL_OUTOF10: MODERATE PAIN (5)

## 2025-08-26 ENCOUNTER — PATIENT OUTREACH (OUTPATIENT)
Dept: CARE COORDINATION | Facility: CLINIC | Age: 30
End: 2025-08-26
Payer: COMMERCIAL

## 2025-08-26 LAB
VIT B12 SERPL-MCNC: 574 PG/ML (ref 232–1245)
VIT D+METAB SERPL-MCNC: 26 NG/ML (ref 20–50)

## 2025-08-28 ENCOUNTER — PATIENT OUTREACH (OUTPATIENT)
Dept: CARE COORDINATION | Facility: CLINIC | Age: 30
End: 2025-08-28
Payer: COMMERCIAL

## 2025-08-29 ENCOUNTER — THERAPY VISIT (OUTPATIENT)
Dept: PHYSICAL THERAPY | Facility: CLINIC | Age: 30
End: 2025-08-29
Attending: NURSE PRACTITIONER
Payer: COMMERCIAL

## 2025-08-29 DIAGNOSIS — G89.29 CHRONIC NECK PAIN: ICD-10-CM

## 2025-08-29 DIAGNOSIS — M79.602 LEFT ARM PAIN: Primary | ICD-10-CM

## 2025-08-29 DIAGNOSIS — M54.2 CHRONIC NECK PAIN: ICD-10-CM

## 2025-08-29 PROCEDURE — 97140 MANUAL THERAPY 1/> REGIONS: CPT | Mod: GP | Performed by: PHYSICAL THERAPIST

## 2025-09-02 ENCOUNTER — THERAPY VISIT (OUTPATIENT)
Dept: PHYSICAL THERAPY | Facility: CLINIC | Age: 30
End: 2025-09-02
Attending: NURSE PRACTITIONER
Payer: COMMERCIAL

## 2025-09-02 DIAGNOSIS — M54.2 CHRONIC NECK PAIN: ICD-10-CM

## 2025-09-02 DIAGNOSIS — M79.602 LEFT ARM PAIN: Primary | ICD-10-CM

## 2025-09-02 DIAGNOSIS — G89.29 CHRONIC NECK PAIN: ICD-10-CM

## 2025-09-02 PROCEDURE — 97110 THERAPEUTIC EXERCISES: CPT | Mod: GP | Performed by: PHYSICAL THERAPIST

## 2025-09-02 PROCEDURE — 97140 MANUAL THERAPY 1/> REGIONS: CPT | Mod: GP | Performed by: PHYSICAL THERAPIST

## 2025-09-03 ENCOUNTER — HOSPITAL ENCOUNTER (OUTPATIENT)
Facility: AMBULATORY SURGERY CENTER | Age: 30
Discharge: HOME | End: 2025-09-03
Attending: PHYSICAL MEDICINE & REHABILITATION | Admitting: PHYSICAL MEDICINE & REHABILITATION
Payer: COMMERCIAL

## 2025-09-03 ENCOUNTER — ANCILLARY PROCEDURE (OUTPATIENT)
Dept: RADIOLOGY | Facility: AMBULATORY SURGERY CENTER | Age: 30
End: 2025-09-03
Attending: PHYSICAL MEDICINE & REHABILITATION
Payer: COMMERCIAL

## 2025-09-03 VITALS
TEMPERATURE: 97.1 F | RESPIRATION RATE: 14 BRPM | OXYGEN SATURATION: 97 % | HEIGHT: 63 IN | SYSTOLIC BLOOD PRESSURE: 150 MMHG | BODY MASS INDEX: 39.87 KG/M2 | HEART RATE: 118 BPM | WEIGHT: 225 LBS | DIASTOLIC BLOOD PRESSURE: 95 MMHG

## 2025-09-03 DIAGNOSIS — R52 PAIN: ICD-10-CM

## 2025-09-03 PROCEDURE — 76942 ECHO GUIDE FOR BIOPSY: CPT | Mod: 26 | Performed by: PHYSICAL MEDICINE & REHABILITATION

## 2025-09-03 PROCEDURE — 20552 NJX 1/MLT TRIGGER POINT 1/2: CPT | Performed by: PHYSICAL MEDICINE & REHABILITATION

## 2025-09-03 PROCEDURE — 20552 NJX 1/MLT TRIGGER POINT 1/2: CPT

## 2025-09-03 RX ORDER — LIDOCAINE HYDROCHLORIDE 20 MG/ML
INJECTION, SOLUTION INFILTRATION; PERINEURAL PRN
Status: DISCONTINUED | OUTPATIENT
Start: 2025-09-03 | End: 2025-09-03 | Stop reason: HOSPADM

## 2025-09-04 ENCOUNTER — MYC MEDICAL ADVICE (OUTPATIENT)
Dept: ORTHOPEDICS | Facility: CLINIC | Age: 30
End: 2025-09-04
Payer: COMMERCIAL

## 2025-09-05 ENCOUNTER — PRE VISIT (OUTPATIENT)
Dept: ORTHOPEDICS | Facility: CLINIC | Age: 30
End: 2025-09-05

## (undated) DEVICE — TRAY PAIN INJECTION 97A 640

## (undated) DEVICE — SET IV EXT 8IN MICRO POWER INJ 7N8300

## (undated) DEVICE — LINEN TOWEL PACK X5 5464

## (undated) DEVICE — GLOVE PROTEXIS POWDER FREE ORANGE 7.0  2D72PT70X

## (undated) DEVICE — PREP CHLORAPREP W/ORANGE TINT 10.5ML 260715